# Patient Record
Sex: FEMALE | Race: BLACK OR AFRICAN AMERICAN | NOT HISPANIC OR LATINO | Employment: OTHER | URBAN - METROPOLITAN AREA
[De-identification: names, ages, dates, MRNs, and addresses within clinical notes are randomized per-mention and may not be internally consistent; named-entity substitution may affect disease eponyms.]

---

## 2017-01-11 PROBLEM — R07.89 ATYPICAL CHEST PAIN: Status: ACTIVE | Noted: 2017-01-11

## 2017-01-11 PROBLEM — Z99.2 ESRD ON DIALYSIS: Status: ACTIVE | Noted: 2017-01-11

## 2017-01-11 PROBLEM — N18.6 ESRD ON DIALYSIS: Status: ACTIVE | Noted: 2017-01-11

## 2017-01-16 ENCOUNTER — HOSPITAL ENCOUNTER (EMERGENCY)
Facility: HOSPITAL | Age: 82
Discharge: HOME | End: 2017-01-16
Attending: EMERGENCY MEDICINE
Payer: MEDICARE

## 2017-01-16 VITALS
BODY MASS INDEX: 20.91 KG/M2 | TEMPERATURE: 98 F | RESPIRATION RATE: 23 BRPM | HEIGHT: 63 IN | SYSTOLIC BLOOD PRESSURE: 161 MMHG | WEIGHT: 118 LBS | OXYGEN SATURATION: 97 % | DIASTOLIC BLOOD PRESSURE: 51 MMHG | HEART RATE: 85 BPM

## 2017-01-16 DIAGNOSIS — N18.6 ESRD (END STAGE RENAL DISEASE) ON DIALYSIS: Primary | ICD-10-CM

## 2017-01-16 DIAGNOSIS — Z99.2 ESRD (END STAGE RENAL DISEASE) ON DIALYSIS: Primary | ICD-10-CM

## 2017-01-16 LAB
ALBUMIN SERPL BCP-MCNC: 3.2 G/DL
ANION GAP SERPL CALC-SCNC: 22 MMOL/L
BUN SERPL-MCNC: 87 MG/DL
CALCIUM SERPL-MCNC: 9 MG/DL
CHLORIDE SERPL-SCNC: 95 MMOL/L
CO2 SERPL-SCNC: 16 MMOL/L
CREAT SERPL-MCNC: 7.9 MG/DL
EST. GFR  (AFRICAN AMERICAN): 5 ML/MIN/1.73 M^2
EST. GFR  (NON AFRICAN AMERICAN): 4 ML/MIN/1.73 M^2
GLUCOSE SERPL-MCNC: 280 MG/DL
PHOSPHATE SERPL-MCNC: 3.6 MG/DL
POTASSIUM SERPL-SCNC: 4.8 MMOL/L
SODIUM SERPL-SCNC: 133 MMOL/L

## 2017-01-16 PROCEDURE — 93010 ELECTROCARDIOGRAM REPORT: CPT | Mod: ,,, | Performed by: INTERNAL MEDICINE

## 2017-01-16 PROCEDURE — 99283 EMERGENCY DEPT VISIT LOW MDM: CPT

## 2017-01-16 PROCEDURE — 80069 RENAL FUNCTION PANEL: CPT

## 2017-01-16 PROCEDURE — 93005 ELECTROCARDIOGRAM TRACING: CPT

## 2017-01-16 NOTE — ED AVS SNAPSHOT
[facility] [address]  [place] [address]               Albina Garcia   2017  8:07 PM   ED    Description:  Female : 3/10/1932   Department:  [facility]           Your Care was Coordinated By:     Provider Role From To    Eliceo Velazquez MD Attending Provider 17 --      Reason for Visit     Needs Dialysis           Diagnoses this Visit        Comments    ESRD (end stage renal disease) on dialysis    -  Primary       ED Disposition     ED Disposition Condition Comment    Discharge             To Do List           Follow-up Information     Follow up with Judie Dsouza MD In 2 days.    Specialty:  Internal Medicine    Contact information:    [address] [facility] [place] [id number] [phone]          Follow up with [facility].    Why:  Attend scheduled appointment Wednesday.     Contact information:    [place], [place] [facility]     [facility] -  Assistance  Registered nurses in the [facility] provide clinical advisement, health education, appointment booking, and other advisory services.  Call for this free service at [phone].             Medications                Verify that the below list of medications is an accurate representation of the medications you are currently taking.  If none reported, the list may be blank. If incorrect, please contact your healthcare provider. Carry this list with you in case of emergency.           Current Medications     amlodipine (NORVASC) 10 MG tablet Take 1 tablet (10 mg total) by mouth once daily.    aspirin 81 MG Chew Take 1 tablet (81 mg total) by mouth once daily.    BD INSULIN SYRINGE ULTRA-FINE 0.5 mL 31 gauge x 5/16 Syrg USE TO INJECT INSULIN 2-3 TIMES A DAY    calcium acetate (PHOSLO) 667 mg capsule TAKE 2 TABLETS 3 TIMES A DAY WITH MEALS    carvedilol (COREG) 6.25 MG tablet Take 1 tablet (6.25 mg total) by mouth 2 (two) times daily.  "   cloNIDine (CATAPRES) 0.1 MG tablet Take 0.1 mg by mouth 2 (two) times daily.    cyproheptadine (PERIACTIN) 4 mg tablet TAKE 1 TABLET EVERY DAY    hydrALAZINE (APRESOLINE) 100 MG tablet Take 1 tablet (100 mg total) by mouth 3 (three) times daily.    insulin glargine (LANTUS) 100 unit/mL injection Inject 4 Units into the skin every evening.    irbesartan (AVAPRO) 300 MG tablet Take 300 mg by mouth once daily.    loperamide (IMODIUM A-D) 2 mg Tab Take 2 mg by mouth 4 (four) times daily as needed.    meclizine (ANTIVERT) 25 mg tablet Take 1 tablet (25 mg total) by mouth 3 (three) times daily as needed for Dizziness.    neomycin-polymyxin-dexamethasone (MAXITROL) 3.5mg/mL-10,000 unit/mL-0.1 % DrpS INSTILL 1 DROP IN BOTH EYE 4 TIMES DAILY    omeprazole (PRILOSEC) 40 MG capsule Take 40 mg by mouth once daily.    propylene glycol (SYSTANE BALANCE) 0.6 % Drop Place 1 drop into both eyes 2 (two) times daily.    SENSIPAR 30 mg Tab TAKE 1 TABLET EVERY DAY           Clinical Reference Information           Your Vitals Were     BP Pulse Temp Resp Height Weight    190/90 (BP Location: Left arm, Patient Position: Sitting) 86 98.1 °F (36.7 °C) (Oral) 20 5' 3" (1.6 m) 53.5 kg (118 lb)    Last Period SpO2 BMI          (LMP Unknown) 99% 20.9 kg/m2        Allergies as of 1/16/2017        Reactions    Penicillins       Immunizations Administered on Date of Encounter - 1/16/2017     None      ED Micro, Lab, POCT     Start Ordered       Status Ordering Provider    01/16/17 2035 01/16/17 2034  Renal function panel  STAT      Final result       ED Imaging Orders     None        Discharge Instructions       Attend your dialysis session on Wednesday as scheduled. If you have shortness of breath, chest pain or any other concerns please return to an Emergency Department.   Chronic Kidney Disease (CKD)    The role of the kidneys is to remove waste products and excess water from the blood.  When the kidneys do not function normally and waste " products begin to build up in the blood, this is called chronic kidney disease (CKD). CKD is defined as the presence of kidney damage or a decrease in kidney function lasting 3 months or more. CKD allows excess water, waste, and toxic substances to build up in the body. This can eventually become life-threatening, requiring dialysis or a kidney transplant to stay alive. This most severe form is called end stage renal disease or ESRD.  Diabetes is the leading causes of chronic renal failure. Other causes include high blood pressure, hardening of the arteries (atherosclerosis), lupus, inflammation of the blood vessels (vasculitis), prior viral and bacterial infections, and others. Certain over-the-counter pain medicines can cause renal failure when taken often over a long period of time. These include aspirin, ibuprofen, and related anti-inflammatory medicines called NSAIDs (nonsteroidal anti-inflammatory drugs).  Home care  The following guidelines will help you care for yourself at home:  1. If you have diabetes, talk to your doctor about the quality of your blood sugar control and any adjustments needed to your diet, lifestyle, or medicines.  2. If you have high blood pressure:  ¨ Take prescribed medicine to lower your blood pressure to the recommended goal of less than 130/80.  ¨ Take up a regular exercise program that you enjoy. Check with your doctor to be sure your planned exercise program is right for you.  ¨ Reduce your salt (sodium) intake. Your doctor can tell you how much salt per day is safe for you.  3. If you are overweight, talk to your doctor about a weight loss plan.  4. If you smoke, you must quit. Smoking worsens kidney disease. Talk to your doctor about ways to help you quit.  For more information, visit the following  links:  ¨ www.[place].gov/sites/default/files/pdf/clearing-the-air-accessible.pdf  ¨ www.smokefree.gov  ¨ www.cancer.org/healthy/stayawayfromtobacco/guidetoquittingsmoking/  5. Most patients with chronic renal failure need to follow a special diet.  Be sure you understand yours. In general, you will need to restrict protein, salt, potassium, and phosphorus. You also need to limit fluid intake. A calcium supplement may be prescribed to protect your bones from osteoporosis.  6. CKD is a risk factor for heart disease. Talk to your doctor about any other risk factors you might have and what you can do to lessen them.  7. Talk to your doctor about any medicines you are taking to determine if they need to be reduced or stopped.  8. Avoid the following over-the-counter medicines, or consult your doctor before using:  ¨ Aspirin and nonsteroidal anti-inflammatory drugs (NSAIDs) such as ibuprofen or naproxen (short-term use of acetaminophen for fever or pain is okay)  ¨ Laxatives and antacids containing magnesium or aluminum  ¨ Fleet or phospho soda enemas containing phosphorus  ¨ Certain stomach acid-blocking medicine such as cimetidine or ranitidine   ¨ Decongestants containing pseudoephedrine   ¨ Herbal supplements  Follow-up care  Follow up with your doctor or as advised by our staff. Contact one of the following for more information:  · American Association of Kidney Patients ([phone] 105-2676 www.aakp.org  · National Kidney Foundation (965) 346-1365 www.kidney.org  · American Kidney Fund (484) 282-8196 www.kidneyfund.org  · National Kidney Disease Education Program (396) 4NMADOQ www.nkdep.nih.gov  [NOTE: If an X-ray, EKG (cardiogram), or other diagnostic test was taken, another specialist will review it. You will be notified of any new findings that may affect your care.]  When to seek medical care  Get prompt medical attention or contact your doctor if any of the following occur:  · Nausea or vomiting  · Fever greater  than 100.4°F (38°C)  · Severe weakness, dizziness, fainting, drowsiness, or confusion  · Chest pain or shortness of breath  · Unexpected weight gain or swelling in the legs, ankles, or around the eyes  · Heart beating fast, slow, or irregularly  · Decrease or absent urine output  © 3778-2871 [employer]. [address] [place] [address]. All rights reserved. This information is not intended as a substitute for professional medical care. Always follow your healthcare professional's instructions.          Your Scheduled Appointments     Feb 07, 2017  2:00 PM CST   [facility] Hemod[facility] with VASCULAR, LAB   Jamaal Sullivan - Vascular Laboratory ([facility] )    [address]  [place] [address] [phone]            Feb 07, 2017  2:30 PM CST   Established Patient Visit with MD Jamaal Caba III - Vascular Surgery ([facility] )    [address]  [place] [address] [phone]              Smoking Cessation     If you would like to quit smoking:   You may be eligible for free services if you are a [place] resident and started smoking cigarettes before September 1, 1988.  Call the Smoking Cessation Trust ([facility]) toll free at (169) 987-4175 or (022) 268-4085.   Call 1-800-QUIT-NOW if you do not meet the above criteria.             [facility] complies with applicable Federal civil rights laws and does not discriminate on the basis of race, color, national origin, age, disability, or sex.        Language Assistance Services     ATTENTION: Language assistance services are available, free of charge. Please call [phone].      ATENCIÓN: Si habla español, tiene a jesus disposición servicios gratuitos de asistencia lingüística. Llame al [phone].     CHÚ Ý: N?u b?n nói Ti?ng Vi?t, có các d?ch v? h? tr? ngôn ng? mi?n phí dành cho b?n. G?i s? [phone].

## 2017-01-17 NOTE — ED PROVIDER NOTES
Encounter Date: 1/16/2017       History     Chief Complaint   Patient presents with    Needs Dialysis     Patient presents to the ED with family who states patient who is at nursing home missed her dialysis appointment today. Patient with no complaints.      Review of patient's allergies indicates:   Allergen Reactions    Penicillins      HPI Comments: CHIEF COMPLAINT: missed dialysis    HISTORY OF PRESENT ILLNESS:This is an 83 yo AAF who is  ESRD on dialysis who presents to the ED today because she missed her dialysis session today. There seems to have been some confusion over her session because she is being transferred to a new facility. Her orders actually were sent to another facility and therefore she was unable to  Receive her dialysis today. She has no shortness of breath, no chest pain, no complaints.     REVIEW OF SYSTEMS:  Constitutional: No fever, no chills.  Eyes: No discharge. No pain  HENT: No nasal drainage. No ear ache. No sore throat   Cardiovascular: No chest pain, no palpitations.  Respiratory: No cough, no shortness of breath.  Gastrointestinal: No abdominal pain, no vomiting. No diarrhea  Genitourinary: No hematuria, dysuria, urgency.  Musculoskeletal: No back pain.  Skin: No rashes, no lesions.  Neurological: No headache, no focal weakness.        The history is provided by the patient and a relative.     Past Medical History   Diagnosis Date    Anemia in CKD (chronic kidney disease) 10/14/2013    Arthritis     Back pain, chronic     Blood transfusion     CHF (congestive heart failure)     Diabetes mellitus     Elevated cholesterol     ESRD on hemodialysis      esrd    HTN (hypertension) 10/14/2013    Metabolic bone disease 10/14/2013     No past medical history pertinent negatives.  Past Surgical History   Procedure Laterality Date    Spinal fusion      Total abdominal hysterectomy      Total knee arthroplasty Left      left     Family History   Problem Relation Age of Onset     Kidney disease Mother     Diabetes Sister      Social History   Substance Use Topics    Smoking status: Former Smoker    Smokeless tobacco: Not on file    Alcohol use No     Review of Systems   All other systems reviewed and are negative.      Physical Exam   Initial Vitals   BP Pulse Resp Temp SpO2   01/16/17 1913 01/16/17 1913 01/16/17 1913 01/16/17 1913 01/16/17 1913   190/90 86 20 98.1 °F (36.7 °C) 99 %     Physical Exam    Nursing note and vitals reviewed.  Constitutional: She appears well-developed and well-nourished.   HENT:   Head: Normocephalic and atraumatic.   Nose: Nose normal.   Mouth/Throat: Oropharynx is clear and moist.   Eyes: Conjunctivae and EOM are normal. Pupils are equal, round, and reactive to light. No scleral icterus.   Neck: Normal range of motion. Neck supple. No JVD present.   Cardiovascular: Normal rate, regular rhythm, normal heart sounds and intact distal pulses. Exam reveals no gallop and no friction rub.    No murmur heard.  Pulmonary/Chest: Breath sounds normal. No stridor. No respiratory distress. She has no wheezes. She exhibits no tenderness.   Abdominal: Soft. Bowel sounds are normal. She exhibits no distension and no mass. There is no tenderness. There is no rebound and no guarding.   Musculoskeletal: Normal range of motion. She exhibits no edema or tenderness.   Neurological: She is alert and oriented to person, place, and time. She has normal strength. No cranial nerve deficit.   Skin: Skin is warm and dry. No rash noted. No pallor.   Psychiatric: She has a normal mood and affect. Thought content normal.         ED Course   Procedures  Labs Reviewed   RENAL FUNCTION PANEL - Abnormal; Notable for the following:        Result Value    Glucose 280 (*)     Sodium 133 (*)     CO2 16 (*)     BUN, Bld 87 (*)     Creatinine 7.9 (*)     Albumin 3.2 (*)     eGFR if  5 (*)     eGFR if non African American 4 (*)     Anion Gap 22 (*)     All other components within  normal limits     EKG Readings: (Independently Interpreted)   Vent. Rate : 086 BPM     Atrial Rate : 086 BPM     P-R Int : 154 ms          QRS Dur : 072 ms      QT Int : 388 ms       P-R-T Axes : 058 019 065 degrees     QTc Int : 464 ms    Normal sinus rhythm  Normal ECG  When compared with ECG of 18-MAY-2015 09:11,  No significant change was found            Medical Decision Making:   Differential Diagnosis:   ESRD on dialysi  Clinical Tests:   Lab Tests: Ordered and Reviewed  Medical Tests: Ordered and Reviewed  ED Management:  This is an 84 o female who missed her dialysis session due to confusion over her new facility. I have spoke with the Nursing Home and it appears that she will be set for dialysis at her next appointment. At this time the pt has no need for emergent dialysis. No need for admission today. She will be discharged back to the Nursing home with directions for return should she experience any shortness of breath, chest pain, if she is unable to get her dialysis session at her nest session or any other concerning symptoms. After taking into careful account the historical factors and physical exam findings of the patient's presentation today, in conjunction with the empirical and objective data obtained on ED workup, no acute emergent medical condition has been identified. The patient appears to be low risk for an emergent medical condition and I feel it is safe and appropriate at this time for the patient to be discharged to follow-up as detailed in their discharge instructions for reevaluation and possible continued outpatient workup and management. Discharge and follow-up instructions discussed with the patient who expressed understanding and willingness to comply with my recommendations.                   ED Course     Clinical Impression:   The encounter diagnosis was ESRD (end stage renal disease) on dialysis.    Disposition:   Disposition: Discharged  Condition: Stable       Sundeepluisana RIZVI  MD Marcus  02/04/17 0907

## 2017-01-17 NOTE — ED TRIAGE NOTES
The patient's daughter states that she brought the patient to the ER because the patient was slipped over for dialysis today. The patient denies any complaints of pain, shortness of breath, weakness, or swelling.

## 2017-01-17 NOTE — DISCHARGE INSTRUCTIONS
Attend your dialysis session on Wednesday as scheduled. If you have shortness of breath, chest pain or any other concerns please return to an Emergency Department.   Chronic Kidney Disease (CKD)    The role of the kidneys is to remove waste products and excess water from the blood.  When the kidneys do not function normally and waste products begin to build up in the blood, this is called chronic kidney disease (CKD). CKD is defined as the presence of kidney damage or a decrease in kidney function lasting 3 months or more. CKD allows excess water, waste, and toxic substances to build up in the body. This can eventually become life-threatening, requiring dialysis or a kidney transplant to stay alive. This most severe form is called end stage renal disease or ESRD.  Diabetes is the leading causes of chronic renal failure. Other causes include high blood pressure, hardening of the arteries (atherosclerosis), lupus, inflammation of the blood vessels (vasculitis), prior viral and bacterial infections, and others. Certain over-the-counter pain medicines can cause renal failure when taken often over a long period of time. These include aspirin, ibuprofen, and related anti-inflammatory medicines called NSAIDs (nonsteroidal anti-inflammatory drugs).  Home care  The following guidelines will help you care for yourself at home:  1. If you have diabetes, talk to your doctor about the quality of your blood sugar control and any adjustments needed to your diet, lifestyle, or medicines.  2. If you have high blood pressure:  ¨ Take prescribed medicine to lower your blood pressure to the recommended goal of less than 130/80.  ¨ Take up a regular exercise program that you enjoy. Check with your doctor to be sure your planned exercise program is right for you.  ¨ Reduce your salt (sodium) intake. Your doctor can tell you how much salt per day is safe for you.  3. If you are overweight, talk to your doctor about a weight loss  plan.  4. If you smoke, you must quit. Smoking worsens kidney disease. Talk to your doctor about ways to help you quit.  For more information, visit the following links:  ¨ www.smokefree.gov/sites/default/files/pdf/clearing-the-air-accessible.pdf  ¨ www.smokefree.gov  ¨ www.cancer.org/healthy/stayawayfromtobacco/guidetoquittingsmoking/  5. Most patients with chronic renal failure need to follow a special diet.  Be sure you understand yours. In general, you will need to restrict protein, salt, potassium, and phosphorus. You also need to limit fluid intake. A calcium supplement may be prescribed to protect your bones from osteoporosis.  6. CKD is a risk factor for heart disease. Talk to your doctor about any other risk factors you might have and what you can do to lessen them.  7. Talk to your doctor about any medicines you are taking to determine if they need to be reduced or stopped.  8. Avoid the following over-the-counter medicines, or consult your doctor before using:  ¨ Aspirin and nonsteroidal anti-inflammatory drugs (NSAIDs) such as ibuprofen or naproxen (short-term use of acetaminophen for fever or pain is okay)  ¨ Laxatives and antacids containing magnesium or aluminum  ¨ Fleet or phospho soda enemas containing phosphorus  ¨ Certain stomach acid-blocking medicine such as cimetidine or ranitidine   ¨ Decongestants containing pseudoephedrine   ¨ Herbal supplements  Follow-up care  Follow up with your doctor or as advised by our staff. Contact one of the following for more information:  · American Association of Kidney Patients (128) 255-3218 www.aakp.org  · National Kidney Foundation (064) 305-8488 www.kidney.org  · American Kidney Fund (328) 279-4146 www.kidneyfund.org  · National Kidney Disease Education Program (218) 3ZWTOJJ www.nkdep.nih.gov  [NOTE: If an X-ray, EKG (cardiogram), or other diagnostic test was taken, another specialist will review it. You will be notified of any new findings that may affect  your care.]  When to seek medical care  Get prompt medical attention or contact your doctor if any of the following occur:  · Nausea or vomiting  · Fever greater than 100.4°F (38°C)  · Severe weakness, dizziness, fainting, drowsiness, or confusion  · Chest pain or shortness of breath  · Unexpected weight gain or swelling in the legs, ankles, or around the eyes  · Heart beating fast, slow, or irregularly  · Decrease or absent urine output  © 8973-4615 [employer]. [address] [place] [address]. All rights reserved. This information is not intended as a substitute for professional medical care. Always follow your healthcare professional's instructions.

## 2017-01-18 DIAGNOSIS — I50.9 CHF (CONGESTIVE HEART FAILURE): Primary | ICD-10-CM

## 2017-01-31 ENCOUNTER — TELEPHONE (OUTPATIENT)
Dept: VASCULAR SURGERY | Facility: CLINIC | Age: 82
End: 2017-01-31

## 2017-01-31 NOTE — TELEPHONE ENCOUNTER
Appt was r/s and mailed out to the pt daughter Sina . I spoke to her and she stated She wanted to make sure that the pt needed to come in for a f/u . I told her yes.

## 2017-01-31 NOTE — TELEPHONE ENCOUNTER
----- Message from Luis Lee sent at 1/30/2017  4:11 PM CST -----  Contact: Sina//Daughter  Caller states that she needs to speak with nurse in ref to if the pt will still need to have her shunt replaced;Pts appts were cancelled earlier today by her  and caller states that they should not have been cancelled;Caller states that the pt is in a nursing facility and would bring the pt if it is time for her to be seen//please call back at [phone] or [phone]//thank you

## 2017-02-03 RX ORDER — CALCIUM ACETATE 667 MG/1
CAPSULE ORAL
Qty: 180 CAPSULE | Refills: 5 | Status: SHIPPED | OUTPATIENT
Start: 2017-02-03

## 2017-02-06 DIAGNOSIS — N18.6 ESRD ON DIALYSIS: Primary | ICD-10-CM

## 2017-02-06 DIAGNOSIS — Z99.2 ESRD ON DIALYSIS: Primary | ICD-10-CM

## 2017-02-06 RX ORDER — CINACALCET 30 MG/1
30 TABLET, FILM COATED ORAL DAILY
Qty: 30 TABLET | Refills: 6 | OUTPATIENT
Start: 2017-02-06

## 2017-02-07 DIAGNOSIS — N18.6 ESRD ON DIALYSIS: Primary | ICD-10-CM

## 2017-02-07 DIAGNOSIS — Z99.2 ESRD ON DIALYSIS: Primary | ICD-10-CM

## 2017-02-07 RX ORDER — CINACALCET 30 MG/1
30 TABLET, FILM COATED ORAL DAILY
Qty: 30 TABLET | Refills: 6 | OUTPATIENT
Start: 2017-02-07

## 2017-02-21 ENCOUNTER — OFFICE VISIT (OUTPATIENT)
Dept: VASCULAR SURGERY | Facility: CLINIC | Age: 82
End: 2017-02-21
Payer: MEDICARE

## 2017-02-21 ENCOUNTER — HOSPITAL ENCOUNTER (OUTPATIENT)
Dept: VASCULAR SURGERY | Facility: CLINIC | Age: 82
Discharge: HOME | End: 2017-02-21
Attending: SURGERY
Payer: MEDICARE

## 2017-02-21 VITALS
HEIGHT: 60 IN | HEART RATE: 77 BPM | WEIGHT: 111 LBS | BODY MASS INDEX: 21.79 KG/M2 | SYSTOLIC BLOOD PRESSURE: 185 MMHG | DIASTOLIC BLOOD PRESSURE: 80 MMHG | TEMPERATURE: 96 F

## 2017-02-21 DIAGNOSIS — T82.858D STENOSIS OF ARTERIOVENOUS DIALYSIS FISTULA, SUBSEQUENT ENCOUNTER: Primary | ICD-10-CM

## 2017-02-21 DIAGNOSIS — N18.6 ESRD (END STAGE RENAL DISEASE) ON DIALYSIS: ICD-10-CM

## 2017-02-21 DIAGNOSIS — Z99.2 ESRD (END STAGE RENAL DISEASE) ON DIALYSIS: ICD-10-CM

## 2017-02-21 PROCEDURE — 93990 DOPPLER FLOW TESTING: CPT | Mod: 26,S$PBB,, | Performed by: SURGERY

## 2017-02-21 PROCEDURE — 99999 PR PBB SHADOW E&M-EST. PATIENT-LVL III: CPT | Mod: PBBFAC,,, | Performed by: SURGERY

## 2017-02-21 PROCEDURE — 99213 OFFICE O/P EST LOW 20 MIN: CPT | Mod: PBBFAC | Performed by: SURGERY

## 2017-02-21 PROCEDURE — 99213 OFFICE O/P EST LOW 20 MIN: CPT | Mod: S$PBB,,, | Performed by: SURGERY

## 2017-02-21 NOTE — PROGRESS NOTES
See my prior note; review of systems, family history and social history are   unchanged.    HISTORY OF PRESENT ILLNESS:  An 84-year-old female with end-stage renal disease   status post:  1.  Angioplasty, left brachiocephalic vein stenosis, stent placement, and   subclavian vein angioplasty, 10/26/2016.  2.  Prior left basilic vein transposition 4 years ago at an outside institution.    She now returns.  She denies any prolonged bleeding or other dialysis problems.    PHYSICAL EXAMINATION:  VITAL SIGNS:  See nursing note.  EXTREMITIES:  Left arm shows significant pulsatility in the first 1-2 cm of the   fistula and after that much improved thrill with only modest underlying   pulsatility.    IMAGING:  Duplex exam does show proximal AV fistula with velocity of 764.  There   is no mid graft velocity shift seen.  The subclavian vein does have somewhat   elevated velocities of 254.  Her prior fistulogram was personally reviewed.  She   did have a stenosis at the thoracic inlet and was angioplastied but not   stented.    Of note, the flow volume of today is 1.3 liters, which is down from 2.1 liters   prior. Before that however, it was 1.1 liters.    ASSESSMENT:  Proximal AV fistula stenosis, but still with robust flow volume and   clinically the flow is quite strong in the body of the fistula.    She is not having any clinical issues.  However, her flow volume appears to have   dropped fairly considerably.    She is reluctant to undergo a prophylactic intervention of this proximal   stenosis unless absolutely necessary.  It is reasonable to watch this closely   given that she is not having any clinical sequelae.    PLAN:  Follow up in eight weeks with repeat surveillance duplex.        /boone [id number] yue(s)        KATHY  dd: 02/21/2017 14:21:51 (CST)  td: 02/22/2017 05:31:14 (CST)  Doc ID   #[id number]  Job ID #[id number]    CC:

## 2017-02-21 NOTE — MR AVS SNAPSHOT
[facility]  [address]  [place] [address]  Phone: [phone]  Fax: [phone]                  Albina Garcia   2017 1:30 PM   Office Visit    Description:  Female : 3/10/1932   Provider:  ERIK Andrade III, MD   Department:  [facility]           Reason for Visit     Follow-up           Diagnoses this Visit        Comments    Stenosis of arteriovenous dialysis fistula, subsequent encounter    -  Primary            To Do List           Goals (5 Years of Data)     None      Follow-Up and Disposition     Return in about 8 weeks (around 2017).      [facility]     [facility] -  Assistance  Registered nurses in the [facility] provide clinical advisement, health education, appointment booking, and other advisory services.  Call for this free service at [phone].             Medications                Verify that the below list of medications is an accurate representation of the medications you are currently taking.  If none reported, the list may be blank. If incorrect, please contact your healthcare provider. Carry this list with you in case of emergency.           Current Medications     amlodipine (NORVASC) 10 MG tablet Take 1 tablet (10 mg total) by mouth once daily.    aspirin 81 MG Chew Take 1 tablet (81 mg total) by mouth once daily.    BD INSULIN SYRINGE ULTRA-FINE 0.5 mL 31 gauge x 5/16 Syrg USE TO INJECT INSULIN 2-3 TIMES A DAY    calcium acetate (PHOSLO) 667 mg capsule TAKE 2 CAPSULES BY MOUTH 3 TIMES A DAY WITH MEALS    carvedilol (COREG) 6.25 MG tablet Take 1 tablet (6.25 mg total) by mouth 2 (two) times daily.    cloNIDine (CATAPRES) 0.1 MG tablet Take 0.1 mg by mouth 2 (two) times daily.    cyproheptadine (PERIACTIN) 4 mg tablet TAKE 1 TABLET EVERY DAY    hydrALAZINE (APRESOLINE) 100 MG tablet Take 1 tablet (100 mg total) by mouth 3 (three) times daily.    insulin glargine (LANTUS) 100  unit/mL injection Inject 4 Units into the skin every evening.    irbesartan (AVAPRO) 300 MG tablet Take 300 mg by mouth once daily.    loperamide (IMODIUM A-D) 2 mg Tab Take 2 mg by mouth 4 (four) times daily as needed.    meclizine (ANTIVERT) 25 mg tablet Take 1 tablet (25 mg total) by mouth 3 (three) times daily as needed for Dizziness.    neomycin-polymyxin-dexamethasone (MAXITROL) 3.5mg/mL-10,000 unit/mL-0.1 % DrpS INSTILL 1 DROP IN BOTH EYE 4 TIMES DAILY    omeprazole (PRILOSEC) 40 MG capsule Take 40 mg by mouth once daily.    propylene glycol (SYSTANE BALANCE) 0.6 % Drop Place 1 drop into both eyes 2 (two) times daily.    SENSIPAR 30 mg Tab TAKE 1 TABLET EVERY DAY           Clinical Reference Information           Your Vitals Were     BP Pulse Temp Height Weight Last Period    185/80 (BP Location: Right arm, Patient Position: Sitting, BP Method: Automatic) 77 96 °F (35.6 °C) (Oral) 5' (1.524 m) 50.3 kg (111 lb) (LMP Unknown)    BMI                21.68 kg/m2          Blood Pressure          Most Recent Value    BP  (!)  185/80      Allergies as of 2/21/2017     Penicillins      Immunizations Administered on Date of Encounter - 2/21/2017     None      Language Assistance Services     ATTENTION: Language assistance services are available, free of charge. Please call [phone].      ATENCIÓN: Si habla español, tiene a jesus disposición servicios gratuitos de asistencia lingüística. Llame al [phone].     CHÚ Ý: N?u b?n nói Ti?ng Vi?t, có các d?ch v? h? tr? ngôn ng? mi?n phí dành cho b?n. G?i s? [phone].         [facility] complies with applicable Federal civil rights laws and does not discriminate on the basis of race, color, national origin, age, disability, or sex.

## 2017-02-23 DIAGNOSIS — N18.6 ESRD (END STAGE RENAL DISEASE) ON DIALYSIS: Primary | ICD-10-CM

## 2017-02-23 DIAGNOSIS — Z99.2 ESRD (END STAGE RENAL DISEASE) ON DIALYSIS: Primary | ICD-10-CM

## 2017-04-18 ENCOUNTER — OFFICE VISIT (OUTPATIENT)
Dept: VASCULAR SURGERY | Facility: CLINIC | Age: 82
End: 2017-04-18
Payer: MEDICARE

## 2017-04-18 ENCOUNTER — HOSPITAL ENCOUNTER (OUTPATIENT)
Dept: VASCULAR SURGERY | Facility: CLINIC | Age: 82
Discharge: HOME | End: 2017-04-18
Attending: SURGERY
Payer: MEDICARE

## 2017-04-18 VITALS
DIASTOLIC BLOOD PRESSURE: 53 MMHG | WEIGHT: 105 LBS | SYSTOLIC BLOOD PRESSURE: 134 MMHG | HEIGHT: 59 IN | HEART RATE: 74 BPM | TEMPERATURE: 97 F | BODY MASS INDEX: 21.17 KG/M2

## 2017-04-18 DIAGNOSIS — N18.6 ESRD (END STAGE RENAL DISEASE) ON DIALYSIS: ICD-10-CM

## 2017-04-18 DIAGNOSIS — Z99.2 ESRD (END STAGE RENAL DISEASE) ON DIALYSIS: ICD-10-CM

## 2017-04-18 DIAGNOSIS — T82.858D STENOSIS OF ARTERIOVENOUS DIALYSIS FISTULA, SUBSEQUENT ENCOUNTER: Primary | ICD-10-CM

## 2017-04-18 PROCEDURE — 99213 OFFICE O/P EST LOW 20 MIN: CPT | Mod: S$PBB,,, | Performed by: SURGERY

## 2017-04-18 PROCEDURE — 93990 DOPPLER FLOW TESTING: CPT | Mod: 26,S$PBB,, | Performed by: SURGERY

## 2017-04-18 PROCEDURE — 99999 PR PBB SHADOW E&M-EST. PATIENT-LVL III: CPT | Mod: PBBFAC,,, | Performed by: SURGERY

## 2017-04-18 PROCEDURE — 99213 OFFICE O/P EST LOW 20 MIN: CPT | Mod: PBBFAC | Performed by: SURGERY

## 2017-04-18 NOTE — PROGRESS NOTES
See my prior note; review of systems, family history and social history are   unchanged.    HISTORY OF PRESENT ILLNESS:  An 85-year-old female with end-stage renal disease,   status post:  1.  Angioplasty, left brachiocephalic stenosis and stent placement and   subclavian vein angioplasty, 10/26/2016.  2.  Prior left basilic vein transposition four years ago at an outside   institution.    She now returns.  This is two-month followup.  She denies any prolonged bleeding   or other dialysis problems.  She was noted to have overall decreased flow rate   and suggestion of a fairly high-grade proximal AV fistula stenosis prior with   velocity of 764.    PHYSICAL EXAMINATION:  VITAL SIGNS:  See nursing note.  EXTREMITIES:  Left arm shows moderate pulsatility in the first 1 to 2 cm of the   fistula though not as impressive as prior.  There is an excellent thrill distal   to this.    IMAGING:  Duplex of the fistula fails to show proximal hemodynamically   significant stenosis, which has been seen prior.  Flow volume is stable at 1.2   liters compared to 1.3 prior.  Of note, the flow velocities have been as high as   2.1 liters.    ASSESSMENT:  Well-functioning fistula with more proximal high-grade stenosis,   not appear to be as impressive today.    PLAN:  1.  Continue observation and lengthen surveillance.  2.  Follow up in four months with surveillance duplex, sooner if clinically   indicated.      KATHY  dd: 04/18/2017 14:21:05 (CDT)  td: 04/19/2017 08:59:16 (CDT)  Doc ID   #[id number]  Job ID #[id number]    CC:

## 2017-04-20 DIAGNOSIS — N18.6 ESRD (END STAGE RENAL DISEASE) ON DIALYSIS: Primary | ICD-10-CM

## 2017-04-20 DIAGNOSIS — Z99.2 ESRD (END STAGE RENAL DISEASE) ON DIALYSIS: Primary | ICD-10-CM

## 2017-05-17 PROBLEM — R07.9 CHEST PAIN: Status: ACTIVE | Noted: 2017-05-17

## 2017-05-18 PROBLEM — I25.10 CORONARY ARTERY DISEASE INVOLVING NATIVE CORONARY ARTERY: Status: ACTIVE | Noted: 2017-05-18

## 2017-05-18 PROBLEM — I20.0 UNSTABLE ANGINA: Status: ACTIVE | Noted: 2017-05-18

## 2017-05-19 PROBLEM — R07.9 CHEST PAIN: Status: RESOLVED | Noted: 2017-05-17 | Resolved: 2017-05-19

## 2017-08-15 ENCOUNTER — OFFICE VISIT (OUTPATIENT)
Dept: VASCULAR SURGERY | Facility: CLINIC | Age: 82
End: 2017-08-15
Payer: MEDICARE

## 2017-08-15 ENCOUNTER — HOSPITAL ENCOUNTER (OUTPATIENT)
Dept: VASCULAR SURGERY | Facility: CLINIC | Age: 82
Discharge: HOME | End: 2017-08-15
Attending: SURGERY
Payer: MEDICARE

## 2017-08-15 VITALS
TEMPERATURE: 98 F | BODY MASS INDEX: 24.35 KG/M2 | DIASTOLIC BLOOD PRESSURE: 74 MMHG | WEIGHT: 124 LBS | HEIGHT: 60 IN | SYSTOLIC BLOOD PRESSURE: 169 MMHG | HEART RATE: 78 BPM

## 2017-08-15 DIAGNOSIS — N18.6 ESRD (END STAGE RENAL DISEASE) ON DIALYSIS: Primary | ICD-10-CM

## 2017-08-15 DIAGNOSIS — Z99.2 ESRD (END STAGE RENAL DISEASE) ON DIALYSIS: ICD-10-CM

## 2017-08-15 DIAGNOSIS — Z99.2 ESRD (END STAGE RENAL DISEASE) ON DIALYSIS: Primary | ICD-10-CM

## 2017-08-15 DIAGNOSIS — N18.6 ESRD (END STAGE RENAL DISEASE) ON DIALYSIS: ICD-10-CM

## 2017-08-15 PROCEDURE — 1157F ADVNC CARE PLAN IN RCRD: CPT | Mod: ,,, | Performed by: SURGERY

## 2017-08-15 PROCEDURE — 99213 OFFICE O/P EST LOW 20 MIN: CPT | Mod: PBBFAC | Performed by: SURGERY

## 2017-08-15 PROCEDURE — 93990 DOPPLER FLOW TESTING: CPT | Mod: 26,S$PBB,, | Performed by: SURGERY

## 2017-08-15 PROCEDURE — 1126F AMNT PAIN NOTED NONE PRSNT: CPT | Mod: ,,, | Performed by: SURGERY

## 2017-08-15 PROCEDURE — 3008F BODY MASS INDEX DOCD: CPT | Mod: ,,, | Performed by: SURGERY

## 2017-08-15 PROCEDURE — 93990 DOPPLER FLOW TESTING: CPT | Mod: PBBFAC | Performed by: SURGERY

## 2017-08-15 PROCEDURE — 99999 PR PBB SHADOW E&M-EST. PATIENT-LVL III: CPT | Mod: PBBFAC,,, | Performed by: SURGERY

## 2017-08-15 PROCEDURE — 1159F MED LIST DOCD IN RCRD: CPT | Mod: ,,, | Performed by: SURGERY

## 2017-08-15 PROCEDURE — 99213 OFFICE O/P EST LOW 20 MIN: CPT | Mod: S$PBB,,, | Performed by: SURGERY

## 2017-08-15 NOTE — PROGRESS NOTES
See my prior note; review of systems, family history and social history are   unchanged.    HISTORY OF PRESENT ILLNESS:  An 85-year-old female with end-stage renal disease,   status post:  1.  Angioplasty, left brachiocephalic stenosis and stent placement and   subclavian vein angioplasty, 10/26/2016.  2.  Prior left basilic vein transposition four years ago at an outside   institution.    She now returns.  This is 4-month followup.  Having only intermittient increased bleeding, no other issues    PHYSICAL EXAMINATION:  VITAL SIGNS:  See nursing note.  EXTREMITIES:  Left arm shows mild pulsatility in the first 1 to 2 cm of the   fistula though not as impressive as prior.  There is an excellent thrill distal   to this.    IMAGING:  Duplex of the fistula fails to show moderate outflow stneosis ().  Flow volume 1.7 (1.2 prior)    ASSESSMENT:  Well-functioning fistula with more proximal high-grade stenosis,   not appear to be as impressive today.    Moderate outflow stenosis only.  Will observe      PLAN:  1.  Continue observation  2.  Follow up in 3 months with surveillance duplex, sooner if clinically   indicated.

## 2017-08-17 DIAGNOSIS — Z99.2 ESRD (END STAGE RENAL DISEASE) ON DIALYSIS: Primary | ICD-10-CM

## 2017-08-17 DIAGNOSIS — N18.6 ESRD (END STAGE RENAL DISEASE) ON DIALYSIS: Primary | ICD-10-CM

## 2017-09-02 PROBLEM — A41.9 SEPSIS: Status: ACTIVE | Noted: 2017-09-02

## 2017-09-03 PROBLEM — I20.0 UNSTABLE ANGINA: Status: RESOLVED | Noted: 2017-05-18 | Resolved: 2017-09-03

## 2017-09-04 PROBLEM — J06.9 UPPER RESPIRATORY INFECTION: Status: ACTIVE | Noted: 2017-09-04

## 2017-09-04 PROBLEM — A41.9 SEPSIS: Status: RESOLVED | Noted: 2017-09-02 | Resolved: 2017-09-04

## 2017-09-04 PROBLEM — N39.0 URINARY TRACT INFECTION WITHOUT HEMATURIA: Status: ACTIVE | Noted: 2017-09-04

## 2017-09-05 ENCOUNTER — OUTPATIENT CASE MANAGEMENT (OUTPATIENT)
Dept: ADMINISTRATIVE | Facility: OTHER | Age: 82
End: 2017-09-05

## 2017-09-05 NOTE — PROGRESS NOTES
Please note that this patient was not enrolled in Outpatient Case Management at this time due to being transferred to a facility.     Please contact [facility] at Ext. [phone] with questions.    Thank you,  Sneha Tinajero

## 2017-09-14 PROBLEM — I21.4 NSTEMI (NON-ST ELEVATED MYOCARDIAL INFARCTION): Status: ACTIVE | Noted: 2017-09-14

## 2017-09-14 PROBLEM — R79.89 ELEVATED TROPONIN: Status: ACTIVE | Noted: 2017-09-14

## 2017-09-14 PROBLEM — R07.9 CHEST PAIN: Status: ACTIVE | Noted: 2017-09-14

## 2017-09-14 PROBLEM — K21.9 GERD (GASTROESOPHAGEAL REFLUX DISEASE): Status: ACTIVE | Noted: 2017-09-14

## 2017-09-20 ENCOUNTER — PATIENT MESSAGE (OUTPATIENT)
Dept: ADMINISTRATIVE | Facility: OTHER | Age: 82
End: 2017-09-20

## 2017-09-29 ENCOUNTER — HOSPITAL ENCOUNTER (OUTPATIENT)
Facility: HOSPITAL | Age: 82
Discharge: HOME | End: 2017-09-29
Attending: INTERNAL MEDICINE | Admitting: INTERNAL MEDICINE
Payer: MEDICARE

## 2017-09-29 VITALS
HEART RATE: 70 BPM | DIASTOLIC BLOOD PRESSURE: 76 MMHG | BODY MASS INDEX: 22.86 KG/M2 | RESPIRATION RATE: 17 BRPM | OXYGEN SATURATION: 96 % | TEMPERATURE: 98 F | WEIGHT: 129 LBS | SYSTOLIC BLOOD PRESSURE: 180 MMHG | HEIGHT: 63 IN

## 2017-09-29 DIAGNOSIS — T82.858D STENOSIS OF ARTERIOVENOUS DIALYSIS FISTULA, SUBSEQUENT ENCOUNTER: ICD-10-CM

## 2017-09-29 DIAGNOSIS — Z99.2 ESRD (END STAGE RENAL DISEASE) ON DIALYSIS: Primary | ICD-10-CM

## 2017-09-29 DIAGNOSIS — N18.6 ESRD (END STAGE RENAL DISEASE) ON DIALYSIS: Primary | ICD-10-CM

## 2017-09-29 LAB — POCT GLUCOSE: 129 MG/DL (ref 70–110)

## 2017-09-29 PROCEDURE — 36902 INTRO CATH DIALYSIS CIRCUIT: CPT | Mod: ,,, | Performed by: INTERNAL MEDICINE

## 2017-09-29 PROCEDURE — 82962 GLUCOSE BLOOD TEST: CPT

## 2017-09-29 PROCEDURE — 63600175 PHARM REV CODE 636 W HCPCS

## 2017-09-29 PROCEDURE — 25000003 PHARM REV CODE 250

## 2017-09-29 PROCEDURE — 25000003 PHARM REV CODE 250: Performed by: INTERNAL MEDICINE

## 2017-09-29 RX ORDER — CLONIDINE HYDROCHLORIDE 0.1 MG/1
0.1 TABLET ORAL ONCE
Status: COMPLETED | OUTPATIENT
Start: 2017-09-29 | End: 2017-09-29

## 2017-09-29 RX ADMIN — CLONIDINE HYDROCHLORIDE 0.1 MG: 0.1 TABLET ORAL at 11:09

## 2017-09-29 NOTE — BRIEF OP NOTE
[facility]  Brief Operative Note     SUMMARY     Surgery Date: 9/29/2017     Surgeon(s) and Role:     * Alpesh Malik MD - Primary    Assisting Surgeon: None    Pre-op Diagnosis:  Malfunction of arteriovenous dialysis fistula, subsequent encounter [T83.968D]    Post-op Diagnosis:  ISR PTA 8 mm x 6 cm ballon and   8 mmx 6 cm Lutonix balloon.    Procedure(s) (LRB):  FISTULOGRAM (Right)    Estimated Blood Loss:  5 ml.         Specimens:   Specimen (12h ago through future)    None

## 2017-09-29 NOTE — PROGRESS NOTES
09/29/17 1101 09/29/17 1115   Vital Signs   BP (!) 206/84 (!) 198/56   BP Location Right arm Right arm   BP Method Automatic Manual   Patient Position Lying Lying     Dr Malik notified of above results. Clonidine ordered and given. Will monitor.

## 2017-09-29 NOTE — DISCHARGE SUMMARY
[facility]  Discharge Note  Short Stay    Admit Date: 9/29/2017    Discharge Date and Time: No discharge date for patient encounter.     Attending Physician: Alpesh Malik MD     Discharge Provider: Alpesh Malik    Diagnoses:  ISR PTA 8 mm x 6 cm ballon and   8 mmx 6 cm Lutonix balloon.  Active Hospital Problems    Diagnosis  POA    Stenosis of arteriovenous dialysis fistula [T82.858A]  Yes      Resolved Hospital Problems    Diagnosis Date Resolved POA   No resolved problems to display.       Discharged Condition: good    Hospital Course: Patient was admitted for an outpatient procedure and tolerated the procedure well with no complications.    Final Diagnoses: Same as principal problem.    Disposition: Home or Self Care    Follow up/Patient Instructions:    Medications:  Reconciled Home Medications:   Current Discharge Medication List      CONTINUE these medications which have NOT CHANGED    Details   acetaminophen (TYLENOL) 325 MG tablet Take 2 tablets (650 mg total) by mouth every 8 (eight) hours as needed for Pain or Temperature greater than (100.4 F).  Refills: 0      aspirin 81 MG Chew Take 1 tablet (81 mg total) by mouth once daily.  Refills: 0      atorvastatin (LIPITOR) 40 MG tablet Take 1 tablet (40 mg total) by mouth once daily.  Qty: 30 tablet, Refills: 11      calcium acetate (PHOSLO) 667 mg capsule TAKE 2 CAPSULES BY MOUTH 3 TIMES A DAY WITH MEALS  Qty: 180 capsule, Refills: 5      carvedilol (COREG) 6.25 MG tablet Take 1 tablet (6.25 mg total) by mouth 2 (two) times daily.  Qty: 60 tablet, Refills: 11      clopidogrel (PLAVIX) 75 mg tablet Take 1 tablet (75 mg total) by mouth once daily.  Qty: 30 tablet, Refills: 11      cyproheptadine (PERIACTIN) 4 mg tablet TAKE 1 TABLET EVERY DAY  Qty: 30 tablet, Refills: 6      hydrALAZINE (APRESOLINE) 100 MG tablet Take 1 tablet (100 mg total) by mouth 3 (three) times daily.  Qty: 270 tablet, Refills: 6      insulin aspart (NOVOLOG) 100  unit/mL InPn pen Inject 0-5 Units into the skin before meals and at bedtime as needed (Hyperglycemia).  Refills: 0      irbesartan (AVAPRO) 300 MG tablet Take 300 mg by mouth once daily.  Refills: 3      levoFLOXacin (LEVAQUIN) 250 MG tablet Take 1 tablet (250 mg total) by mouth every other day. FOR 10 MORE DAYS      meclizine (ANTIVERT) 25 mg tablet Take 1 tablet (25 mg total) by mouth 3 (three) times daily as needed for Dizziness.  Qty: 30 tablet, Refills: 0      polyvinyl alcohol, artificial tears, (LIQUIFILM TEARS) 1.4 % ophthalmic solution Place 1 drop into both eyes as needed.      propylene glycol (SYSTANE BALANCE) 0.6 % Drop Place 1 drop into both eyes 2 (two) times daily.      SENSIPAR 30 mg Tab TAKE 1 TABLET EVERY DAY  Qty: 30 tablet, Refills: 6      vitamin renal formula, B-complex-vitamin c-folic acid, (NEPHROCAP) 1 mg Cap Take 1 capsule by mouth once daily.  Qty: 30 capsule, Refills: 3      amlodipine (NORVASC) 10 MG tablet Take 1 tablet (10 mg total) by mouth once daily.  Qty: 30 tablet, Refills: 3      BD INSULIN SYRINGE ULTRA-FINE 0.5 mL 31 gauge x 5/16 Syrg USE TO INJECT INSULIN 2-3 TIMES A DAY  Refills: 3      loperamide (IMODIUM A-D) 2 mg Tab Take 2 mg by mouth 4 (four) times daily as needed.      nitroGLYCERIN (NITROSTAT) 0.4 MG SL tablet Place 1 tablet (0.4 mg total) under the tongue every 5 (five) minutes as needed for Chest pain (maximum of 3 tablets in 15 minutes.).  Qty: 30 tablet, Refills: 0      omeprazole (PRILOSEC) 40 MG capsule Take 40 mg by mouth once daily.  Refills: 0           No discharge procedures on file.      Discharge Procedure Orders; resume previous diet and activity. Follow up as needed.

## 2017-09-29 NOTE — PROGRESS NOTES
Pt DC'd per MD order. Discharge instructions given including activity, wound care, S&S of infections, future appointments, and when to call MD. Medications reviewed including when to take next dose. PIV DC'd, catheter tip intact. Pt left unit via wheelchair with caregiver.

## 2017-09-29 NOTE — PLAN OF CARE
Problem: Patient Care Overview  Goal: Plan of Care Review  Outcome: Ongoing (interventions implemented as appropriate)  Received report from Brooke. Patient s/p fistulogram, AAOx3. VSS, no c/o pain or discomfort at this time, resp even and unlabored. Gauze/tegaderm dressing to L upper arm is CDI. No active bleeding. No hematoma noted. Post procedure protocol reviewed with patient and patient's caregiver. Understanding verbalized. Caregiver at bedside. Nurse call bell within reach. Will continue to monitor per post procedure protocol.

## 2017-09-29 NOTE — H&P
[facility]  History & Physical    Subjective:      Chief Complaint/Reason for Admission:  Prolonged bleeding after HD needle withdrawal.    Albina Garcia is a 85 y.o. female here with left BVT AVF  Prolonged bleeding after HD needle withdrawal.      Past Medical History:   Diagnosis Date    Anemia in CKD (chronic kidney disease) 10/14/2013    Arthritis     Back pain, chronic     Blood transfusion     CHF (congestive heart failure)     Diabetes mellitus     Elevated cholesterol     ESRD on hemodialysis     esrd    HTN (hypertension) 10/14/2013    Metabolic bone disease 10/14/2013     Past Surgical History:   Procedure Laterality Date    SPINAL FUSION      TOTAL ABDOMINAL HYSTERECTOMY      TOTAL KNEE ARTHROPLASTY Left     left     Family History   Problem Relation Age of Onset    Kidney disease Mother     Diabetes Sister      Social History   Substance Use Topics    Smoking status: Former Smoker    Smokeless tobacco: Never Used    Alcohol use No       PTA Medications   Medication Sig    acetaminophen (TYLENOL) 325 MG tablet Take 2 tablets (650 mg total) by mouth every 8 (eight) hours as needed for Pain or Temperature greater than (100.4 F).    aspirin 81 MG Chew Take 1 tablet (81 mg total) by mouth once daily.    atorvastatin (LIPITOR) 40 MG tablet Take 1 tablet (40 mg total) by mouth once daily.    calcium acetate (PHOSLO) 667 mg capsule TAKE 2 CAPSULES BY MOUTH 3 TIMES A DAY WITH MEALS    carvedilol (COREG) 6.25 MG tablet Take 1 tablet (6.25 mg total) by mouth 2 (two) times daily.    clopidogrel (PLAVIX) 75 mg tablet Take 1 tablet (75 mg total) by mouth once daily.    cyproheptadine (PERIACTIN) 4 mg tablet TAKE 1 TABLET EVERY DAY    hydrALAZINE (APRESOLINE) 100 MG tablet Take 1 tablet (100 mg total) by mouth 3 (three) times daily.    insulin aspart (NOVOLOG) 100 unit/mL InPn pen Inject 0-5 Units into the skin before meals and at bedtime as needed (Hyperglycemia).     irbesartan (AVAPRO) 300 MG tablet Take 300 mg by mouth once daily.    levoFLOXacin (LEVAQUIN) 250 MG tablet Take 1 tablet (250 mg total) by mouth every other day. FOR 10 MORE DAYS    meclizine (ANTIVERT) 25 mg tablet Take 1 tablet (25 mg total) by mouth 3 (three) times daily as needed for Dizziness.    polyvinyl alcohol, artificial tears, (LIQUIFILM TEARS) 1.4 % ophthalmic solution Place 1 drop into both eyes as needed.    propylene glycol (SYSTANE BALANCE) 0.6 % Drop Place 1 drop into both eyes 2 (two) times daily.    SENSIPAR 30 mg Tab TAKE 1 TABLET EVERY DAY    vitamin renal formula, B-complex-vitamin c-folic acid, (NEPHROCAP) 1 mg Cap Take 1 capsule by mouth once daily.    amlodipine (NORVASC) 10 MG tablet Take 1 tablet (10 mg total) by mouth once daily.    BD INSULIN SYRINGE ULTRA-FINE 0.5 mL 31 gauge x 5/16 Syrg USE TO INJECT INSULIN 2-3 TIMES A DAY    loperamide (IMODIUM A-D) 2 mg Tab Take 2 mg by mouth 4 (four) times daily as needed.    nitroGLYCERIN (NITROSTAT) 0.4 MG SL tablet Place 1 tablet (0.4 mg total) under the tongue every 5 (five) minutes as needed for Chest pain (maximum of 3 tablets in 15 minutes.).    omeprazole (PRILOSEC) 40 MG capsule Take 40 mg by mouth once daily.     Review of patient's allergies indicates:   Allergen Reactions    Penicillins         ROS Constitutional: No fever or chills, no weight changes.  Eyes: No visual changes or photophobia  HEENT: No nasal congestion or sore throat  Respiratory: No cough or shortness of breath  Cardiovascular: No chest pain or palpitations  Gastrointestinal: Good appetite, no nausea or vomiting, no change in bowel habits  Genitourinary: No hematuria or dysuria  Skin: No rash or pruritis  Hematologic/lymphatic: No easy bruising, bleeding or lymphadenopathy  Musculoskeletal: No arthralgias or myalgias  Neurological: No seizures or tremors  Endocrine: No heat/cold intolerance.  No polyuria/polydipsia.  Psychiatric:  No depression or  anxiety.    Objective:      Vital Signs (Most Recent)  Temp: 97.8 °F (36.6 °C) (09/29/17 0846)  Pulse: 76 (09/29/17 0846)  Resp: 17 (09/29/17 0846)  BP: (!) 199/84 (09/29/17 0846)  SpO2: (!) 94 % (09/29/17 0846)    Vital Signs Range (Last 24H):  Temp:  [97.8 °F (36.6 °C)]   Pulse:  [76]   Resp:  [17]   BP: (199)/(84)   SpO2:  [94 %]     Physical Exam   General appearance: Well developed, well nourished  Head: Normocephalic, atraumatic  Eyes:  Conjunctivae nl. Sclera anicteric. PERRL.  HEENT: Lips, mucosa, and tongue normal; teeth and gums normal and oropharynx clear.  Neck: Supple, trachea midline, thyroid not enlarged,   Lungs: Clear to auscultation bilaterally and normal respiratory effort  Heart: Regular rate and rhythm, S1, S2 normal, no murmur, click, rub or gallop  Abdomen: Soft, non-tender non-distended; bowel sounds normal; no masses,  no organomegaly  Extremities: No cyanosis or clubbing. No edema  Pulses: 2+ and symmetric  Skin: Skin color, texture, turgor normal. No rashes or lesions  Lymph nodes: Cervical, supraclavicular, and axillary nodes normal.  Neurologic: Normal strength and tone. No focal numbness or weakness  Psychiatric:  Alert and oriented times 3.  Affect appropriate.  Left BVT AVF; Good thrill at AA, water hammer pulse.high pitched discontnous systolic murmur.    Assessment:      Active Hospital Problems    Diagnosis  POA    Stenosis of arteriovenous dialysis fistula [T82.858A]  Yes      Resolved Hospital Problems    Diagnosis Date Resolved POA   No resolved problems to display.       Plan:    1. Fistulagram with PTA.

## 2017-09-29 NOTE — PROGRESS NOTES
09/29/17 1349   Vital Signs   BP (!) 180/76   BP Location Right arm   BP Method Automatic   Patient Position Sitting     Dr Malik notified of above. Instructed okay to discharge.

## 2017-10-02 NOTE — OP NOTE
DATE OF PROCEDURE:  09/29/2017    PROCEDURE TYPE:  Angiogram of her left basilic vein transposition fistula.    INDICATION:  Prolonged bleeding after dialysis.    :  Alpesh Malik M.D.    POSTPROCEDURE DIAGNOSES:  1.  Superior venacavogram.  2.  Subclavian venogram.  3.  Axillary subclavian junction of 50% stenosis followed by in-stent restenosis   and rest of the shunt was patent.  At this point, PTA of in stent  Re -stenosis   and subclavian axillary junction with 8 mm x 6 cm Lutonix balloon and prior to   that with 8 mm x 6 cm Throckmorton balloon.    PROCEDURE NOTE:  Size 7 mm x 6 cm Throckmorton balloon and reflux arteriogram.  No   evidence of inflow stenosis.    PROCEDURE NOTE IN DETAIL:  After informed consent was obtained from Ms. Garcia,   she was brought into Access Suite and placed in a supine position.  The area of   her left basilic vein transposition fistula was cleaned and draped in the usual   sterile fashion.  After achieving local anesthesia with lidocaine and   epinephrine, access was cannulated with a micropuncture needle in antegrade   direction using tactile sensation, the Mandril wire was advanced and a 5-Bahamian   sheath was established.  Multiple angiographic runs revealed there were patent   superior vena cava.  There was brachiocephalic vein stenosis 40% followed by   subclavian vein stenosis as subclavian axillary junction stenosis 50% followed   by in-stent restenosis and rest of the shunt was patent.  Given her significant   symptoms of bleeding, I decided to intervene the lesion.  Angled Glidewire was   advanced under fluoroscopy guidance into  SVC   Followed by that, 5-Bahamian sheath was   exchanged to a 7-Bahamian sheath.  Using a road map assistance, the subclavian   axillary vein was balloon angioplastied with Throckmorton balloon followed by followed   by 8 mm x 6 cm Lutonix drug-coated balloon.  It was left for 3 minutes per   protocol.  Followed by that instent restenosis, it was  angioplastied with  With 7   mm x 4 cm [place] balloon and 8 mm x 6 cm [place] balloon and balloon was   reflux arteriogram ,  There was no evidence of any inflow stenosis, patent   brachial artery.  Balloon and wire were removed and repeat angiogram revealed   there was brisk flow.  Upon examination of the access, the access cannulation zone   was much softer.  .  The 7-Kyrgyz sheath was removed after securing with 3-0   Prolene.  No immediate complications.  Estimated blood loss was 5 mL.  The   patient tolerated the procedure well and was discharged from Access Suite in   stable condition.      DRK/IN  dd: 09/29/2017 11:17:05 (CDT)  td: 09/30/2017 01:10:13 (CDT)  Doc ID   #[id number]  Job ID #[id number]    CC:

## 2017-12-19 ENCOUNTER — HOSPITAL ENCOUNTER (OUTPATIENT)
Dept: VASCULAR SURGERY | Facility: CLINIC | Age: 82
Discharge: HOME | End: 2017-12-19
Attending: SURGERY
Payer: MEDICARE

## 2017-12-19 ENCOUNTER — OFFICE VISIT (OUTPATIENT)
Dept: VASCULAR SURGERY | Facility: CLINIC | Age: 82
End: 2017-12-19
Attending: SURGERY
Payer: MEDICARE

## 2017-12-19 VITALS
BODY MASS INDEX: 23.22 KG/M2 | WEIGHT: 123 LBS | TEMPERATURE: 98 F | SYSTOLIC BLOOD PRESSURE: 187 MMHG | DIASTOLIC BLOOD PRESSURE: 66 MMHG | HEART RATE: 76 BPM | HEIGHT: 61 IN

## 2017-12-19 DIAGNOSIS — N18.6 ESRD (END STAGE RENAL DISEASE) ON DIALYSIS: ICD-10-CM

## 2017-12-19 DIAGNOSIS — Z99.2 ESRD (END STAGE RENAL DISEASE) ON DIALYSIS: ICD-10-CM

## 2017-12-19 DIAGNOSIS — Z99.2 ESRD (END STAGE RENAL DISEASE) ON DIALYSIS: Primary | ICD-10-CM

## 2017-12-19 DIAGNOSIS — N18.6 ESRD (END STAGE RENAL DISEASE) ON DIALYSIS: Primary | ICD-10-CM

## 2017-12-19 PROCEDURE — 99213 OFFICE O/P EST LOW 20 MIN: CPT | Mod: PBBFAC | Performed by: SURGERY

## 2017-12-19 PROCEDURE — 99213 OFFICE O/P EST LOW 20 MIN: CPT | Mod: S$PBB,,, | Performed by: SURGERY

## 2017-12-19 PROCEDURE — 93990 DOPPLER FLOW TESTING: CPT | Mod: PBBFAC | Performed by: SURGERY

## 2017-12-19 PROCEDURE — 99999 PR PBB SHADOW E&M-EST. PATIENT-LVL III: CPT | Mod: PBBFAC,,, | Performed by: SURGERY

## 2017-12-19 PROCEDURE — 93990 DOPPLER FLOW TESTING: CPT | Mod: 26,S$PBB,, | Performed by: SURGERY

## 2017-12-19 NOTE — PROGRESS NOTES
See my prior note; review of systems, family history and social history are   unchanged.    HISTORY OF PRESENT ILLNESS:  An 85-year-old female NHP with end-stage renal disease,   status post:  1.  Angioplasty, left brachiocephalic stenosis and stent placement and   subclavian vein angioplasty, 10/26/2016.  2.  Prior left basilic vein transposition four years ago at an outside   institution.    She now returns.  This is 4-month followup. No complaints    PHYSICAL EXAMINATION:  VITAL SIGNS:  See nursing note.  EXTREMITIES:  Left arm shows excellent thrill      IMAGING:  Duplex of the fistula shows no outflow stenosis and flow volume on 1.4 L    ASSESSMENT:  Well-functioning fistula    PLAN:  1. Lengthen surveillance   2.  Follow up in 6 months with surveillance duplex, sooner if clinically   Indicated.    JANNIE Andrade III, MD, FACS  Professor and Chief, Vascular and Endovascular Surgery

## 2018-01-09 PROBLEM — N39.0 URINARY TRACT INFECTION WITHOUT HEMATURIA: Status: RESOLVED | Noted: 2017-09-04 | Resolved: 2018-01-09

## 2018-02-05 PROBLEM — R79.89 ELEVATED TROPONIN: Status: ACTIVE | Noted: 2018-02-05

## 2018-02-05 PROBLEM — R74.8 ABNORMAL SERUM LIPASE LEVEL: Status: ACTIVE | Noted: 2018-02-05

## 2018-02-05 PROBLEM — I21.4 NSTEMI (NON-ST ELEVATED MYOCARDIAL INFARCTION): Status: RESOLVED | Noted: 2017-09-14 | Resolved: 2018-02-05

## 2018-02-05 PROBLEM — E83.39 HYPOPHOSPHATEMIA: Status: ACTIVE | Noted: 2018-02-05

## 2018-03-15 ENCOUNTER — TELEPHONE (OUTPATIENT)
Dept: GASTROENTEROLOGY | Facility: CLINIC | Age: 83
End: 2018-03-15

## 2018-03-15 ENCOUNTER — OFFICE VISIT (OUTPATIENT)
Dept: GASTROENTEROLOGY | Facility: CLINIC | Age: 83
End: 2018-03-15
Payer: MEDICARE

## 2018-03-15 ENCOUNTER — LAB VISIT (OUTPATIENT)
Dept: LAB | Facility: HOSPITAL | Age: 83
End: 2018-03-15
Attending: NURSE PRACTITIONER
Payer: MEDICARE

## 2018-03-15 VITALS — DIASTOLIC BLOOD PRESSURE: 59 MMHG | HEART RATE: 73 BPM | SYSTOLIC BLOOD PRESSURE: 141 MMHG

## 2018-03-15 DIAGNOSIS — R74.8 ELEVATED LIPASE: ICD-10-CM

## 2018-03-15 DIAGNOSIS — K21.9 GASTROESOPHAGEAL REFLUX DISEASE, ESOPHAGITIS PRESENCE NOT SPECIFIED: ICD-10-CM

## 2018-03-15 DIAGNOSIS — R74.8 ELEVATED LIPASE: Primary | ICD-10-CM

## 2018-03-15 LAB
ALBUMIN SERPL BCP-MCNC: 4 G/DL
ALP SERPL-CCNC: 244 U/L
ALT SERPL W/O P-5'-P-CCNC: 24 U/L
ANION GAP SERPL CALC-SCNC: 19 MMOL/L
AST SERPL-CCNC: 31 U/L
BILIRUB SERPL-MCNC: 0.9 MG/DL
BUN SERPL-MCNC: 43 MG/DL
CALCIUM SERPL-MCNC: 9.4 MG/DL
CHLORIDE SERPL-SCNC: 92 MMOL/L
CO2 SERPL-SCNC: 27 MMOL/L
CREAT SERPL-MCNC: 5.1 MG/DL
EST. GFR  (AFRICAN AMERICAN): 8 ML/MIN/1.73 M^2
EST. GFR  (NON AFRICAN AMERICAN): 7 ML/MIN/1.73 M^2
GLUCOSE SERPL-MCNC: 315 MG/DL
LIPASE SERPL-CCNC: 39 U/L
POTASSIUM SERPL-SCNC: 3.7 MMOL/L
PROT SERPL-MCNC: 7.6 G/DL
SODIUM SERPL-SCNC: 138 MMOL/L

## 2018-03-15 PROCEDURE — 99212 OFFICE O/P EST SF 10 MIN: CPT | Mod: PBBFAC,PO | Performed by: NURSE PRACTITIONER

## 2018-03-15 PROCEDURE — 99203 OFFICE O/P NEW LOW 30 MIN: CPT | Mod: S$PBB,,, | Performed by: NURSE PRACTITIONER

## 2018-03-15 PROCEDURE — 36415 COLL VENOUS BLD VENIPUNCTURE: CPT

## 2018-03-15 PROCEDURE — 83690 ASSAY OF LIPASE: CPT

## 2018-03-15 PROCEDURE — 80053 COMPREHEN METABOLIC PANEL: CPT

## 2018-03-15 PROCEDURE — 99999 PR PBB SHADOW E&M-EST. PATIENT-LVL II: CPT | Mod: PBBFAC,,, | Performed by: NURSE PRACTITIONER

## 2018-03-15 NOTE — TELEPHONE ENCOUNTER
Will try 300mg qhs as she is having all of her complaints at night.  Informed Isabel.   After trial fo higher nighttime dose, she can use whichever dosing is most effective and FU as scheduled.

## 2018-03-15 NOTE — TELEPHONE ENCOUNTER
Spoke with pts pharmacy. They needed clarification on the Zantac 300mg. They said that she is already taking Zantac 150mg TID. They need something in writing in order for her to stop the Zantac 150mg TID.

## 2018-03-15 NOTE — TELEPHONE ENCOUNTER
----- Message from WENDY Nair sent at 3/15/2018 12:51 PM CDT -----  Informed wesly at [facility] of lab results.

## 2018-03-15 NOTE — PROGRESS NOTES
"Subjective:       Patient ID: Albina Garcia is a 86 y.o. female.    Chief Complaint: Heartburn and Gastroesophageal Reflux    HPI  Recently hospitalized with complaints of chest pain.  Cardiac workup was negative.  She does have cardiac history with stents placed less than one year ago and is followed by Dr. Castano.    Her pain was described as burning and sour liquid into the mouth.  She was started on omeprazole daily and had improvement but not resolution.  Reports reflux and burning "comes and goes".  She notes complaints at night and admits she eats dinner and then typically goes to bed.  She lives at a nursing home.  Denies abdominal pain, vomiting, dysphagia.  Denies bowel changes, blood with bowel movements or black stools.  During admission lipase was noted to be elevated at 771.  Alk phos slightly elevated.  LFT and bilirubin normal  US unremarkable.    Review of Systems   Constitutional: Negative for activity change, appetite change, fatigue and fever.   HENT: Negative for trouble swallowing.    Respiratory: Positive for chest tightness. Negative for shortness of breath.    Gastrointestinal: Negative for abdominal distention, abdominal pain, blood in stool, constipation, diarrhea and vomiting.   Musculoskeletal: Positive for arthralgias.   Skin: Negative.    Neurological: Negative.    Psychiatric/Behavioral: Negative.        Objective:      Physical Exam   Constitutional: She is oriented to person, place, and time. She appears well-developed and well-nourished. No distress.   Eyes: No scleral icterus.   Cardiovascular: Normal rate.    Pulmonary/Chest: Effort normal. No respiratory distress.   Abdominal: Soft. She exhibits no distension. There is no tenderness. There is no guarding.   Neurological: She is alert and oriented to person, place, and time.   Skin: Skin is warm and dry. She is not diaphoretic.   Psychiatric: She has a normal mood and affect. Her behavior is normal. Judgment and thought content " normal.   Vitals reviewed.      Assessment:       1. Elevated lipase    2. Gastroesophageal reflux disease, esophagitis presence not specified        Plan:     Albina was seen today for heartburn and gastroesophageal reflux.    Diagnoses and all orders for this visit:    Elevated lipase  -     Comprehensive metabolic panel; Future  -     Lipase; Future    Gastroesophageal reflux disease, esophagitis presence not specified    Other orders  -     ranitidine (ZANTAC) 300 MG tablet; Take 1 tablet (300 mg total) by mouth every evening.      Will add ranitidine at night.  Discussed reflux prevention including elevating HOB at night and staying up for 3-4 hours after eating.    She is on plavix for heart stents within the last year.      We have discussed the option of EGD for evaluation.  She does not wish to schedule at this time.  She would not be able to hold plavix even if she was agreeable to proceed.    Will follow up in three months to discuss symptoms.     Any alarm symptoms, black stools or dysphagia would indicate need for more immediate consideration of endoscopy.  Otherwise, she does not wish to pursue.  We have discussed at length the risks and benefits of EGD in relation to her symptoms, age, other medical problems and plavix use.

## 2018-03-15 NOTE — TELEPHONE ENCOUNTER
----- Message from Helena Ramirez sent at 3/15/2018 11:22 AM CDT -----  Contact: Isabel meyers/[facility]   [phone]  Isabel need clarification on the patient prescription ranitidine (ZANTAC) 300 MG tablet  Please call and advise

## 2018-04-21 ENCOUNTER — HOSPITAL ENCOUNTER (INPATIENT)
Facility: HOSPITAL | Age: 83
LOS: 4 days | Discharge: NURSING, REHAB OR LONG-TERM CARE | DRG: 280 | End: 2018-04-25
Attending: FAMILY MEDICINE | Admitting: FAMILY MEDICINE
Payer: MEDICARE

## 2018-04-21 DIAGNOSIS — I21.4 NSTEMI (NON-ST ELEVATED MYOCARDIAL INFARCTION): Primary | ICD-10-CM

## 2018-04-21 DIAGNOSIS — Z71.89 GOALS OF CARE, COUNSELING/DISCUSSION: ICD-10-CM

## 2018-04-21 DIAGNOSIS — M54.50 LOW BACK PAIN, NON-SPECIFIC: ICD-10-CM

## 2018-04-21 DIAGNOSIS — Z71.89 COUNSELING REGARDING ADVANCED CARE PLANNING AND GOALS OF CARE: ICD-10-CM

## 2018-04-21 DIAGNOSIS — Z99.2 ESRD (END STAGE RENAL DISEASE) ON DIALYSIS: ICD-10-CM

## 2018-04-21 DIAGNOSIS — N18.6 ESRD (END STAGE RENAL DISEASE) ON DIALYSIS: ICD-10-CM

## 2018-04-21 DIAGNOSIS — I50.32 CHRONIC DIASTOLIC CONGESTIVE HEART FAILURE: Chronic | ICD-10-CM

## 2018-04-21 DIAGNOSIS — R79.89 ELEVATED TROPONIN: ICD-10-CM

## 2018-04-21 DIAGNOSIS — Z51.5 PALLIATIVE CARE ENCOUNTER: ICD-10-CM

## 2018-04-21 DIAGNOSIS — I25.119 CORONARY ARTERY DISEASE INVOLVING NATIVE CORONARY ARTERY OF NATIVE HEART WITH ANGINA PECTORIS: ICD-10-CM

## 2018-04-21 DIAGNOSIS — R79.89 ELEVATED TROPONIN LEVEL: ICD-10-CM

## 2018-04-21 LAB
ESTIMATED AVG GLUCOSE: 154 MG/DL
HBA1C MFR BLD HPLC: 7 %

## 2018-04-21 PROCEDURE — 93010 ELECTROCARDIOGRAM REPORT: CPT | Mod: ,,, | Performed by: INTERNAL MEDICINE

## 2018-04-21 PROCEDURE — 84484 ASSAY OF TROPONIN QUANT: CPT

## 2018-04-21 PROCEDURE — 83036 HEMOGLOBIN GLYCOSYLATED A1C: CPT

## 2018-04-21 PROCEDURE — 94761 N-INVAS EAR/PLS OXIMETRY MLT: CPT

## 2018-04-21 PROCEDURE — 93005 ELECTROCARDIOGRAM TRACING: CPT

## 2018-04-21 PROCEDURE — 12000002 HC ACUTE/MED SURGE SEMI-PRIVATE ROOM

## 2018-04-21 PROCEDURE — G0379 DIRECT REFER HOSPITAL OBSERV: HCPCS

## 2018-04-21 PROCEDURE — 36415 COLL VENOUS BLD VENIPUNCTURE: CPT

## 2018-04-21 PROCEDURE — G0378 HOSPITAL OBSERVATION PER HR: HCPCS

## 2018-04-21 RX ORDER — ATORVASTATIN CALCIUM 40 MG/1
40 TABLET, FILM COATED ORAL NIGHTLY
Status: DISCONTINUED | OUTPATIENT
Start: 2018-04-21 | End: 2018-04-25 | Stop reason: HOSPADM

## 2018-04-21 RX ORDER — IRBESARTAN 150 MG/1
300 TABLET ORAL DAILY
Status: DISCONTINUED | OUTPATIENT
Start: 2018-04-22 | End: 2018-04-25 | Stop reason: HOSPADM

## 2018-04-21 RX ORDER — GUAIFENESIN/DEXTROMETHORPHAN 100-10MG/5
5 SYRUP ORAL EVERY 4 HOURS PRN
Status: DISCONTINUED | OUTPATIENT
Start: 2018-04-21 | End: 2018-04-25 | Stop reason: HOSPADM

## 2018-04-21 RX ORDER — SODIUM CHLORIDE 0.9 % (FLUSH) 0.9 %
5 SYRINGE (ML) INJECTION
Status: DISCONTINUED | OUTPATIENT
Start: 2018-04-21 | End: 2018-04-25 | Stop reason: HOSPADM

## 2018-04-21 RX ORDER — NAPROXEN SODIUM 220 MG/1
81 TABLET, FILM COATED ORAL DAILY
Status: DISCONTINUED | OUTPATIENT
Start: 2018-04-22 | End: 2018-04-25 | Stop reason: HOSPADM

## 2018-04-21 RX ORDER — CARVEDILOL 6.25 MG/1
6.25 TABLET ORAL 2 TIMES DAILY
Status: DISCONTINUED | OUTPATIENT
Start: 2018-04-21 | End: 2018-04-25 | Stop reason: HOSPADM

## 2018-04-21 RX ORDER — INSULIN ASPART 100 [IU]/ML
0-5 INJECTION, SOLUTION INTRAVENOUS; SUBCUTANEOUS
Status: DISCONTINUED | OUTPATIENT
Start: 2018-04-21 | End: 2018-04-25

## 2018-04-21 RX ORDER — ACETAMINOPHEN 325 MG/1
650 TABLET ORAL EVERY 8 HOURS PRN
Status: DISCONTINUED | OUTPATIENT
Start: 2018-04-21 | End: 2018-04-25 | Stop reason: HOSPADM

## 2018-04-21 RX ORDER — ONDANSETRON 2 MG/ML
4 INJECTION INTRAMUSCULAR; INTRAVENOUS EVERY 6 HOURS PRN
Status: DISCONTINUED | OUTPATIENT
Start: 2018-04-21 | End: 2018-04-25 | Stop reason: HOSPADM

## 2018-04-21 RX ORDER — HEPARIN SODIUM 5000 [USP'U]/ML
5000 INJECTION, SOLUTION INTRAVENOUS; SUBCUTANEOUS EVERY 8 HOURS
Status: DISCONTINUED | OUTPATIENT
Start: 2018-04-21 | End: 2018-04-25 | Stop reason: HOSPADM

## 2018-04-21 RX ORDER — IBUPROFEN 200 MG
24 TABLET ORAL
Status: DISCONTINUED | OUTPATIENT
Start: 2018-04-21 | End: 2018-04-25

## 2018-04-21 RX ORDER — NITROGLYCERIN 0.4 MG/1
0.4 TABLET SUBLINGUAL EVERY 5 MIN PRN
Status: DISCONTINUED | OUTPATIENT
Start: 2018-04-21 | End: 2018-04-25 | Stop reason: HOSPADM

## 2018-04-21 RX ORDER — CINACALCET 30 MG/1
30 TABLET, FILM COATED ORAL DAILY
Status: DISCONTINUED | OUTPATIENT
Start: 2018-04-22 | End: 2018-04-25 | Stop reason: HOSPADM

## 2018-04-21 RX ORDER — IBUPROFEN 200 MG
16 TABLET ORAL
Status: DISCONTINUED | OUTPATIENT
Start: 2018-04-21 | End: 2018-04-25

## 2018-04-21 RX ORDER — CALCIUM ACETATE 667 MG/1
1334 CAPSULE ORAL
Status: DISCONTINUED | OUTPATIENT
Start: 2018-04-22 | End: 2018-04-23

## 2018-04-21 RX ORDER — IPRATROPIUM BROMIDE AND ALBUTEROL SULFATE 2.5; .5 MG/3ML; MG/3ML
3 SOLUTION RESPIRATORY (INHALATION) EVERY 6 HOURS PRN
Status: DISCONTINUED | OUTPATIENT
Start: 2018-04-21 | End: 2018-04-22

## 2018-04-21 RX ORDER — DIPHENHYDRAMINE HYDROCHLORIDE 50 MG/ML
25 INJECTION INTRAMUSCULAR; INTRAVENOUS EVERY 6 HOURS PRN
Status: DISCONTINUED | OUTPATIENT
Start: 2018-04-22 | End: 2018-04-25 | Stop reason: HOSPADM

## 2018-04-21 RX ORDER — PANTOPRAZOLE SODIUM 40 MG/1
40 TABLET, DELAYED RELEASE ORAL DAILY
Status: DISCONTINUED | OUTPATIENT
Start: 2018-04-22 | End: 2018-04-25 | Stop reason: HOSPADM

## 2018-04-21 RX ORDER — GLUCAGON 1 MG
1 KIT INJECTION
Status: DISCONTINUED | OUTPATIENT
Start: 2018-04-21 | End: 2018-04-25

## 2018-04-21 RX ORDER — HYDRALAZINE HYDROCHLORIDE 25 MG/1
100 TABLET, FILM COATED ORAL 3 TIMES DAILY
Status: DISCONTINUED | OUTPATIENT
Start: 2018-04-22 | End: 2018-04-25 | Stop reason: HOSPADM

## 2018-04-21 RX ORDER — CLOPIDOGREL BISULFATE 75 MG/1
75 TABLET ORAL DAILY
Status: DISCONTINUED | OUTPATIENT
Start: 2018-04-22 | End: 2018-04-25 | Stop reason: HOSPADM

## 2018-04-22 PROBLEM — N30.00 ACUTE CYSTITIS WITHOUT HEMATURIA: Status: ACTIVE | Noted: 2018-04-22

## 2018-04-22 LAB
ALBUMIN SERPL BCP-MCNC: 3.5 G/DL
ALP SERPL-CCNC: 295 U/L
ALT SERPL W/O P-5'-P-CCNC: 21 U/L
ANION GAP SERPL CALC-SCNC: 16 MMOL/L
AST SERPL-CCNC: 31 U/L
BASOPHILS # BLD AUTO: 0.03 K/UL
BASOPHILS NFR BLD: 0.3 %
BILIRUB SERPL-MCNC: 0.7 MG/DL
BUN SERPL-MCNC: 46 MG/DL
CALCIUM SERPL-MCNC: 9.4 MG/DL
CHLORIDE SERPL-SCNC: 91 MMOL/L
CHOLEST SERPL-MCNC: 102 MG/DL
CHOLEST/HDLC SERPL: 2.3 {RATIO}
CO2 SERPL-SCNC: 25 MMOL/L
CREAT SERPL-MCNC: 5.4 MG/DL
DIFFERENTIAL METHOD: ABNORMAL
EOSINOPHIL # BLD AUTO: 0 K/UL
EOSINOPHIL NFR BLD: 0.2 %
ERYTHROCYTE [DISTWIDTH] IN BLOOD BY AUTOMATED COUNT: 17.2 %
EST. GFR  (AFRICAN AMERICAN): 8 ML/MIN/1.73 M^2
EST. GFR  (NON AFRICAN AMERICAN): 7 ML/MIN/1.73 M^2
GLUCOSE SERPL-MCNC: 332 MG/DL
HCT VFR BLD AUTO: 32.2 %
HDLC SERPL-MCNC: 45 MG/DL
HDLC SERPL: 44.1 %
HGB BLD-MCNC: 10 G/DL
LDLC SERPL CALC-MCNC: 44 MG/DL
LYMPHOCYTES # BLD AUTO: 0.6 K/UL
LYMPHOCYTES NFR BLD: 6.8 %
MAGNESIUM SERPL-MCNC: 2 MG/DL
MCH RBC QN AUTO: 28.8 PG
MCHC RBC AUTO-ENTMCNC: 31.1 G/DL
MCV RBC AUTO: 93 FL
MONOCYTES # BLD AUTO: 1.2 K/UL
MONOCYTES NFR BLD: 12.6 %
NEUTROPHILS # BLD AUTO: 7.4 K/UL
NEUTROPHILS NFR BLD: 80 %
NONHDLC SERPL-MCNC: 57 MG/DL
PHOSPHATE SERPL-MCNC: 2.9 MG/DL
PLATELET # BLD AUTO: 207 K/UL
PMV BLD AUTO: 11.4 FL
POCT GLUCOSE: 311 MG/DL (ref 70–110)
POCT GLUCOSE: 315 MG/DL (ref 70–110)
POCT GLUCOSE: 344 MG/DL (ref 70–110)
POTASSIUM SERPL-SCNC: 4.5 MMOL/L
PROCALCITONIN SERPL IA-MCNC: 1.87 NG/ML
PROT SERPL-MCNC: 7.5 G/DL
RBC # BLD AUTO: 3.47 M/UL
SODIUM SERPL-SCNC: 132 MMOL/L
TRIGL SERPL-MCNC: 65 MG/DL
TROPONIN I SERPL DL<=0.01 NG/ML-MCNC: 1.2 NG/ML
TROPONIN I SERPL DL<=0.01 NG/ML-MCNC: 1.94 NG/ML
WBC # BLD AUTO: 9.27 K/UL

## 2018-04-22 PROCEDURE — 99223 1ST HOSP IP/OBS HIGH 75: CPT | Mod: ,,, | Performed by: INTERNAL MEDICINE

## 2018-04-22 PROCEDURE — 25000003 PHARM REV CODE 250: Performed by: STUDENT IN AN ORGANIZED HEALTH CARE EDUCATION/TRAINING PROGRAM

## 2018-04-22 PROCEDURE — 84100 ASSAY OF PHOSPHORUS: CPT

## 2018-04-22 PROCEDURE — 27000646 HC AEROBIKA DEVICE

## 2018-04-22 PROCEDURE — 63600175 PHARM REV CODE 636 W HCPCS: Performed by: STUDENT IN AN ORGANIZED HEALTH CARE EDUCATION/TRAINING PROGRAM

## 2018-04-22 PROCEDURE — 93005 ELECTROCARDIOGRAM TRACING: CPT

## 2018-04-22 PROCEDURE — 84145 PROCALCITONIN (PCT): CPT

## 2018-04-22 PROCEDURE — 36415 COLL VENOUS BLD VENIPUNCTURE: CPT

## 2018-04-22 PROCEDURE — 84484 ASSAY OF TROPONIN QUANT: CPT

## 2018-04-22 PROCEDURE — 25000242 PHARM REV CODE 250 ALT 637 W/ HCPCS: Performed by: STUDENT IN AN ORGANIZED HEALTH CARE EDUCATION/TRAINING PROGRAM

## 2018-04-22 PROCEDURE — 25000242 PHARM REV CODE 250 ALT 637 W/ HCPCS: Performed by: FAMILY MEDICINE

## 2018-04-22 PROCEDURE — 94761 N-INVAS EAR/PLS OXIMETRY MLT: CPT

## 2018-04-22 PROCEDURE — 94640 AIRWAY INHALATION TREATMENT: CPT

## 2018-04-22 PROCEDURE — 80053 COMPREHEN METABOLIC PANEL: CPT

## 2018-04-22 PROCEDURE — 85025 COMPLETE CBC W/AUTO DIFF WBC: CPT

## 2018-04-22 PROCEDURE — 94664 DEMO&/EVAL PT USE INHALER: CPT

## 2018-04-22 PROCEDURE — 99900035 HC TECH TIME PER 15 MIN (STAT)

## 2018-04-22 PROCEDURE — 93010 ELECTROCARDIOGRAM REPORT: CPT | Mod: ,,, | Performed by: INTERNAL MEDICINE

## 2018-04-22 PROCEDURE — 80100016 HC MAINTENANCE HEMODIALYSIS

## 2018-04-22 PROCEDURE — 12000002 HC ACUTE/MED SURGE SEMI-PRIVATE ROOM

## 2018-04-22 PROCEDURE — 83735 ASSAY OF MAGNESIUM: CPT

## 2018-04-22 PROCEDURE — 80061 LIPID PANEL: CPT

## 2018-04-22 RX ORDER — IPRATROPIUM BROMIDE AND ALBUTEROL SULFATE 2.5; .5 MG/3ML; MG/3ML
3 SOLUTION RESPIRATORY (INHALATION) EVERY 4 HOURS
Status: DISCONTINUED | OUTPATIENT
Start: 2018-04-22 | End: 2018-04-22

## 2018-04-22 RX ORDER — IPRATROPIUM BROMIDE AND ALBUTEROL SULFATE 2.5; .5 MG/3ML; MG/3ML
3 SOLUTION RESPIRATORY (INHALATION) EVERY 4 HOURS
Status: DISCONTINUED | OUTPATIENT
Start: 2018-04-22 | End: 2018-04-25 | Stop reason: HOSPADM

## 2018-04-22 RX ORDER — SODIUM CHLORIDE 9 MG/ML
INJECTION, SOLUTION INTRAVENOUS ONCE
Status: DISCONTINUED | OUTPATIENT
Start: 2018-04-22 | End: 2018-04-25 | Stop reason: HOSPADM

## 2018-04-22 RX ORDER — SODIUM CHLORIDE 9 MG/ML
INJECTION, SOLUTION INTRAVENOUS
Status: DISCONTINUED | OUTPATIENT
Start: 2018-04-22 | End: 2018-04-25 | Stop reason: HOSPADM

## 2018-04-22 RX ORDER — LIDOCAINE 50 MG/G
2 PATCH TOPICAL
Status: DISCONTINUED | OUTPATIENT
Start: 2018-04-22 | End: 2018-04-25

## 2018-04-22 RX ORDER — SODIUM CHLORIDE 9 MG/ML
INJECTION, SOLUTION INTRAVENOUS ONCE
Status: COMPLETED | OUTPATIENT
Start: 2018-04-22 | End: 2018-04-25

## 2018-04-22 RX ORDER — FUROSEMIDE 10 MG/ML
20 INJECTION INTRAMUSCULAR; INTRAVENOUS ONCE
Status: COMPLETED | OUTPATIENT
Start: 2018-04-22 | End: 2018-04-22

## 2018-04-22 RX ADMIN — IPRATROPIUM BROMIDE AND ALBUTEROL SULFATE 3 ML: .5; 3 SOLUTION RESPIRATORY (INHALATION) at 08:04

## 2018-04-22 RX ADMIN — IPRATROPIUM BROMIDE AND ALBUTEROL SULFATE 3 ML: .5; 3 SOLUTION RESPIRATORY (INHALATION) at 03:04

## 2018-04-22 RX ADMIN — CARVEDILOL 6.25 MG: 6.25 TABLET, FILM COATED ORAL at 12:04

## 2018-04-22 RX ADMIN — FUROSEMIDE 20 MG: 10 INJECTION, SOLUTION INTRAMUSCULAR; INTRAVENOUS at 08:04

## 2018-04-22 RX ADMIN — ATORVASTATIN CALCIUM 40 MG: 40 TABLET, FILM COATED ORAL at 09:04

## 2018-04-22 RX ADMIN — CARVEDILOL 6.25 MG: 6.25 TABLET, FILM COATED ORAL at 08:04

## 2018-04-22 RX ADMIN — HEPARIN SODIUM 5000 UNITS: 5000 INJECTION, SOLUTION INTRAVENOUS; SUBCUTANEOUS at 11:04

## 2018-04-22 RX ADMIN — INSULIN ASPART 4 UNITS: 100 INJECTION, SOLUTION INTRAVENOUS; SUBCUTANEOUS at 05:04

## 2018-04-22 RX ADMIN — LIDOCAINE 2 PATCH: 50 PATCH TOPICAL at 10:04

## 2018-04-22 RX ADMIN — HEPARIN SODIUM 5000 UNITS: 5000 INJECTION, SOLUTION INTRAVENOUS; SUBCUTANEOUS at 12:04

## 2018-04-22 RX ADMIN — ASPIRIN 81 MG 81 MG: 81 TABLET ORAL at 08:04

## 2018-04-22 RX ADMIN — PANTOPRAZOLE SODIUM 40 MG: 40 TABLET, DELAYED RELEASE ORAL at 08:04

## 2018-04-22 RX ADMIN — IRBESARTAN 300 MG: 150 TABLET ORAL at 08:04

## 2018-04-22 RX ADMIN — HYPROMELLOSE 2910 2 DROP: 5 SOLUTION OPHTHALMIC at 08:04

## 2018-04-22 RX ADMIN — CEFTRIAXONE SODIUM 1 G: 1 INJECTION, POWDER, FOR SOLUTION INTRAMUSCULAR; INTRAVENOUS at 10:04

## 2018-04-22 RX ADMIN — INSULIN ASPART 2 UNITS: 100 INJECTION, SOLUTION INTRAVENOUS; SUBCUTANEOUS at 09:04

## 2018-04-22 RX ADMIN — HYDRALAZINE HYDROCHLORIDE 100 MG: 25 TABLET, FILM COATED ORAL at 11:04

## 2018-04-22 RX ADMIN — HYDRALAZINE HYDROCHLORIDE 100 MG: 25 TABLET, FILM COATED ORAL at 08:04

## 2018-04-22 RX ADMIN — CEFTRIAXONE SODIUM 1 G: 1 INJECTION, POWDER, FOR SOLUTION INTRAMUSCULAR; INTRAVENOUS at 12:04

## 2018-04-22 RX ADMIN — HEPARIN SODIUM 5000 UNITS: 5000 INJECTION, SOLUTION INTRAVENOUS; SUBCUTANEOUS at 08:04

## 2018-04-22 RX ADMIN — HYDRALAZINE HYDROCHLORIDE 100 MG: 25 TABLET, FILM COATED ORAL at 04:04

## 2018-04-22 RX ADMIN — GUAIFENESIN AND DEXTROMETHORPHAN 5 ML: 100; 10 SYRUP ORAL at 09:04

## 2018-04-22 RX ADMIN — Medication 1 CAPSULE: at 08:04

## 2018-04-22 RX ADMIN — CARVEDILOL 6.25 MG: 6.25 TABLET, FILM COATED ORAL at 11:04

## 2018-04-22 RX ADMIN — HEPARIN SODIUM 5000 UNITS: 5000 INJECTION, SOLUTION INTRAVENOUS; SUBCUTANEOUS at 04:04

## 2018-04-22 RX ADMIN — HYPROMELLOSE 2910 2 DROP: 5 SOLUTION OPHTHALMIC at 12:04

## 2018-04-22 RX ADMIN — HYPROMELLOSE 2910 2 DROP: 5 SOLUTION OPHTHALMIC at 05:04

## 2018-04-22 RX ADMIN — CLOPIDOGREL BISULFATE 75 MG: 75 TABLET ORAL at 08:04

## 2018-04-22 RX ADMIN — CALCIUM ACETATE 1334 MG: 667 CAPSULE ORAL at 08:04

## 2018-04-22 RX ADMIN — CALCIUM ACETATE 1334 MG: 667 CAPSULE ORAL at 05:04

## 2018-04-22 RX ADMIN — ATORVASTATIN CALCIUM 40 MG: 40 TABLET, FILM COATED ORAL at 12:04

## 2018-04-22 RX ADMIN — CINACALCET HYDROCHLORIDE 30 MG: 30 TABLET, COATED ORAL at 08:04

## 2018-04-22 NOTE — SUBJECTIVE & OBJECTIVE
Past Medical History:   Diagnosis Date    Anemia in CKD (chronic kidney disease) 10/14/2013    Arthritis     Back pain, chronic     Blood transfusion     CHF (congestive heart failure)     Diabetes mellitus     Elevated cholesterol     ESRD on hemodialysis     esrd    HTN (hypertension) 10/14/2013    Metabolic bone disease 10/14/2013     Past Surgical History:   Procedure Laterality Date    SPINAL FUSION      TOTAL ABDOMINAL HYSTERECTOMY      TOTAL KNEE ARTHROPLASTY Left     left     Review of patient's allergies indicates:   Allergen Reactions    Penicillins      Current Facility-Administered Medications on File Prior to Encounter   Medication    [COMPLETED] albuterol-ipratropium 2.5mg-0.5mg/3mL nebulizer solution 6 mL    [COMPLETED] aspirin tablet 325 mg    [COMPLETED] furosemide injection 40 mg    [COMPLETED] levoFLOXacin 250 mg/50 mL IVPB 250 mg     Current Outpatient Prescriptions on File Prior to Encounter   Medication Sig    acetaminophen (TYLENOL) 325 MG tablet Take 2 tablets (650 mg total) by mouth every 8 (eight) hours as needed for Pain or Temperature greater than (100.4 F).    aspirin 81 MG Chew Take 1 tablet (81 mg total) by mouth once daily.    atorvastatin (LIPITOR) 40 MG tablet Take 1 tablet (40 mg total) by mouth once daily. (Patient taking differently: Take 40 mg by mouth nightly. )    BD INSULIN SYRINGE ULTRA-FINE 0.5 mL 31 gauge x 5/16 Syrg USE TO INJECT INSULIN 2-3 TIMES A DAY    calcium acetate (PHOSLO) 667 mg capsule TAKE 2 CAPSULES BY MOUTH 3 TIMES A DAY WITH MEALS    carvedilol (COREG) 6.25 MG tablet Take 1 tablet (6.25 mg total) by mouth 2 (two) times daily.    clopidogrel (PLAVIX) 75 mg tablet Take 1 tablet (75 mg total) by mouth once daily.    dextromethorphan-guaifenesin  mg Tab Take 1 tablet by mouth every 8 (eight) hours as needed.    DICYCLOMINE HCL (GI COCKTAIL SIMPLE RECORD) GI Cocktail (Mylanta 30 mL, 2% viscous lidocaine 10 mL, dicyclomine 10 mL)     hydrALAZINE (APRESOLINE) 100 MG tablet Take 1 tablet (100 mg total) by mouth 3 (three) times daily.    insulin aspart (NOVOLOG) 100 unit/mL InPn pen Inject 0-5 Units into the skin before meals and at bedtime as needed (Hyperglycemia).    irbesartan (AVAPRO) 300 MG tablet Take 300 mg by mouth once daily.    loperamide (IMODIUM A-D) 2 mg Tab Take 2 mg by mouth 4 (four) times daily as needed.    meclizine (ANTIVERT) 25 mg tablet Take 1 tablet (25 mg total) by mouth 3 (three) times daily as needed for Dizziness.    nitroGLYCERIN (NITROSTAT) 0.4 MG SL tablet Place 1 tablet (0.4 mg total) under the tongue every 5 (five) minutes as needed for Chest pain (maximum of 3 tablets in 15 minutes.).    omeprazole (PRILOSEC) 40 MG capsule Take 40 mg by mouth once daily.    polyvinyl alcohol, artificial tears, (LIQUIFILM TEARS) 1.4 % ophthalmic solution Place 1 drop into both eyes as needed.    propylene glycol (SYSTANE BALANCE) 0.6 % Drop Place 1 drop into both eyes 2 (two) times daily.    ranitidine (ZANTAC) 300 MG tablet Take 1 tablet (300 mg total) by mouth every evening.    SENSIPAR 30 mg Tab TAKE 1 TABLET EVERY DAY    vitamin renal formula, B-complex-vitamin c-folic acid, (NEPHROCAP) 1 mg Cap Take 1 capsule by mouth once daily.    white petrolatum-mineral oil 57.3-42.5% (REFRESH P.M.) 57.3-42.5 % Oint every evening.     Family History     Problem Relation (Age of Onset)    Diabetes Sister    Kidney disease Mother        Social History Main Topics    Smoking status: Former Smoker    Smokeless tobacco: Never Used    Alcohol use No    Drug use: No    Sexual activity: Not on file     Review of Systems   Constitutional: Positive for appetite change (decreased since going [place] and fatigue. Negative for diaphoresis and fever.        Malaise   Eyes: Positive for pain (dry eye (r)).   Respiratory: Negative for shortness of breath and wheezing.    Cardiovascular: Negative for chest pain.   Gastrointestinal:  "Negative for abdominal pain, constipation, diarrhea, nausea and vomiting.   Genitourinary: Negative for dysuria.   Musculoskeletal: Negative for gait problem.   Neurological: Positive for weakness. Negative for seizures and headaches.   Psychiatric/Behavioral: Negative for sleep disturbance.   All other systems reviewed and are negative.     Objective:     Vital Signs (Most Recent):  Pulse: 94 (04/22/18 0000)  BP: (!) 156/69 (04/22/18 0000)  SpO2: 98 % (04/21/18 2350) Vital Signs (24h Range):  Temp:  [98 °F (36.7 °C)-98.5 °F (36.9 °C)] 98 °F (36.7 °C)  Pulse:  [] 94  Resp:  [18-26] 18  SpO2:  [98 %-100 %] 98 %  BP: (140-156)/(61-95) 156/69     Physical Exam   Constitutional: She is oriented to person, place, and time. No distress.   Appears stated age, elderly, frail   HENT:   Head: Normocephalic and atraumatic.   Mouth/Throat: No oropharyngeal exudate.   Eyes:   Right eye kept shut: "dry"   Neck: Normal range of motion. Neck supple. No JVD present.   Cardiovascular: Normal rate, regular rhythm, normal heart sounds and intact distal pulses.    No murmur heard.  Left upper arm fistula w/ good thrill   Pulmonary/Chest: Effort normal. No respiratory distress. She has no wheezes. She has rales.   Frequent cough   Abdominal: Soft. Bowel sounds are normal. She exhibits no distension and no mass. There is no tenderness.   Musculoskeletal: Normal range of motion. She exhibits no edema or deformity.   Neurological: She is alert and oriented to person, place, and time. No cranial nerve deficit.   Skin: Capillary refill takes less than 2 seconds. She is not diaphoretic.   Psychiatric: She has a normal mood and affect. Her behavior is normal.   Nursing note and vitals reviewed.       Significant Labs:   CBC:   Recent Labs  Lab 04/21/18 1813   WBC 10.51   HGB 9.8*   HCT 31.7*        CMP:   Recent Labs  Lab 04/21/18 1813   *   K 4.0   CL 91*   CO2 29   *   BUN 42*   CREATININE 4.40*   CALCIUM 8.9 " "  PROT 7.4   ALBUMIN 4.2   BILITOT 0.7   ALKPHOS 297*   AST 39   ALT 23   ANIONGAP 15   EGFRNONAA 8.5*     Troponin:   Recent Labs  Lab 04/21/18  1813 04/21/18  2308   TROPONINI 0.547* 1.199*     Urine Studies:   Recent Labs  Lab 04/21/18  1826   COLORU Yellow   APPEARANCEUA Cloudy*   PHUR 7.0   SPECGRAV 1.025   PROTEINUA 3+*   GLUCUA Negative   KETONESU Negative   BILIRUBINUA Negative   OCCULTUA 2+*   NITRITE Negative   UROBILINOGEN Negative   LEUKOCYTESUR 2+*   RBCUA 3   WBCUA >100*   BACTERIA Many*   HYALINECASTS 0       Significant Imaging:  CXR: "Asymmetric interstitial opacities involving lung bases and left tenzin-hilar region. Edema or infection would have this appearance. Consider BMP"  "

## 2018-04-22 NOTE — ASSESSMENT & PLAN NOTE
Trop in ED 0.547 > 1.199 (baseline 0.03)  No chest pain currently  EKG with no ST changes, but + for flipped T waves in inferior leads  Cardiology consulted, will see pt in AM

## 2018-04-22 NOTE — ASSESSMENT & PLAN NOTE
Sputum color change from white to yellow, non-smoker  In nursing home, so potentially would need to cover for HAP  Xray demonstrates bilat infiltrates vs edema  Given dose of levo x 1 in ED.  On rocephin x 5 days for UTI + possible PNA (was taking Z-armani)

## 2018-04-22 NOTE — PT/OT/SLP PROGRESS
"Physical Therapy      Patient Name:  Albina Garcia   MRN:  [id number]    Patient not seen today secondary to pt refused in AM, pt focused on "breathing thing one of those people were going to bring her".  Spoke w nsg who went to assist pt.  Followed up in PM with second attempt but pt in HD.   Will follow-up as able.    Elham Sams, PT    "

## 2018-04-22 NOTE — ASSESSMENT & PLAN NOTE
BNP > 70,000 but with no leg swelling and some wheezing  Last ECHO was :    EF of 55%   mild mitral valve regurg   diastolic dysfunction   small pericardial effusion   mild tricuspid regurg  Avoiding IVF fluids for now as CXR demonstrates likely overload  BP stable, albeit slightly high On irbesartan 200 mg daily, hydarlazine 100 mg po TID  Lipids:   TC: 173   Tri   HDL: 36   LDLca

## 2018-04-22 NOTE — ASSESSMENT & PLAN NOTE
Urine 2+ leuks, >100 WBCs, with many bacteria, nitrite neg  Urine culture pending, with susceptibilities  Avoiding nitrofurantin and bactrim due to geriatric population and renal disease  Giving 5 days of Rocephin for additional respiratory coverage

## 2018-04-22 NOTE — ASSESSMENT & PLAN NOTE
MWF Dialysis  Cr: 4.4, GFR: 9.8  Completed full dialysis Friday  Will likely consult Nephrology in case pt needs to be dialyzed while in-patient

## 2018-04-22 NOTE — PLAN OF CARE
Plan of care reviewed with patient, understanding verbalized.  Pt remains SR on tele. No complaints overnight. Bed alarm on, call light in reach, fall precautions in place. Will continue to monitor.

## 2018-04-22 NOTE — H&P
[facility] Hospital Medicine  History & Physical    Patient Name: Albina Garcia  MRN: [id number]  Admission Date: 4/21/2018  Attending Physician: Braeden Holman  Primary Care Provider: Judie Dsouza MD      Patient information was obtained from patient, relative(s) and ER records.     Subjective:     Principal Problem:Elevated troponin level    Chief Complaint: Weakness and malaise     HPI: 85 yo F w/ ESRD on HD, CAD (s/p PCI 5/17 and stent placement in 9/17 w/ Dr. Castano), HTN, GERD, DM (HgbA1C 6.3- on diet control), chronic diastolic HF [facility] for elevated troponin.  Original EMS call placed from nursing home was for SOB, but pt now reports only fatigue and overall malaise.  She and her daughter state this has been present to 2 weeks, but she has had decreased PO intake for 9 months since going to the nursing home.    Dialysis is MWF.  She was dialyzed yesterday for a full treatment with no complications.  Denies chest pain although trop is elevated beyond her baseline mild elevation.  EKG shows no ST changes.    She also reports a productive cough.  Sputum had been white and frothy, but today was yellow.  Roommate at nursing home also with cough.  Had been taking Z-armani.  Denies fevers, chills, abdom pain, N/V.   Complains of dry right eye chronically.    Pt admitted for ACS rule-out.  Also, found to have UTI on UA. Culture pending.  Will treat empirically and follow cultures, though has PCN allergy and renal disease. Currently no chest pain.      ED talked with cardiology and pt will evaluated further by them in the AM.    Past Medical History:   Diagnosis Date    Anemia in CKD (chronic kidney disease) 10/14/2013    Arthritis     Back pain, chronic     Blood transfusion     CHF (congestive heart failure)     Diabetes mellitus     Elevated cholesterol     ESRD on hemodialysis     esrd    HTN (hypertension) 10/14/2013    Metabolic bone disease  10/14/2013     Past Surgical History:   Procedure Laterality Date    SPINAL FUSION      TOTAL ABDOMINAL HYSTERECTOMY      TOTAL KNEE ARTHROPLASTY Left     left     Review of patient's allergies indicates:   Allergen Reactions    Penicillins      Current Facility-Administered Medications on File Prior to Encounter   Medication    [COMPLETED] albuterol-ipratropium 2.5mg-0.5mg/3mL nebulizer solution 6 mL    [COMPLETED] aspirin tablet 325 mg    [COMPLETED] furosemide injection 40 mg    [COMPLETED] levoFLOXacin 250 mg/50 mL IVPB 250 mg     Current Outpatient Prescriptions on File Prior to Encounter   Medication Sig    acetaminophen (TYLENOL) 325 MG tablet Take 2 tablets (650 mg total) by mouth every 8 (eight) hours as needed for Pain or Temperature greater than (100.4 F).    aspirin 81 MG Chew Take 1 tablet (81 mg total) by mouth once daily.    atorvastatin (LIPITOR) 40 MG tablet Take 1 tablet (40 mg total) by mouth once daily. (Patient taking differently: Take 40 mg by mouth nightly. )    BD INSULIN SYRINGE ULTRA-FINE 0.5 mL 31 gauge x 5/16 Syrg USE TO INJECT INSULIN 2-3 TIMES A DAY    calcium acetate (PHOSLO) 667 mg capsule TAKE 2 CAPSULES BY MOUTH 3 TIMES A DAY WITH MEALS    carvedilol (COREG) 6.25 MG tablet Take 1 tablet (6.25 mg total) by mouth 2 (two) times daily.    clopidogrel (PLAVIX) 75 mg tablet Take 1 tablet (75 mg total) by mouth once daily.    dextromethorphan-guaifenesin  mg Tab Take 1 tablet by mouth every 8 (eight) hours as needed.    DICYCLOMINE HCL (GI COCKTAIL SIMPLE RECORD) GI Cocktail (Mylanta 30 mL, 2% viscous lidocaine 10 mL, dicyclomine 10 mL)    hydrALAZINE (APRESOLINE) 100 MG tablet Take 1 tablet (100 mg total) by mouth 3 (three) times daily.    insulin aspart (NOVOLOG) 100 unit/mL InPn pen Inject 0-5 Units into the skin before meals and at bedtime as needed (Hyperglycemia).    irbesartan (AVAPRO) 300 MG tablet Take 300 mg by mouth once daily.    loperamide (IMODIUM  A-D) 2 mg Tab Take 2 mg by mouth 4 (four) times daily as needed.    meclizine (ANTIVERT) 25 mg tablet Take 1 tablet (25 mg total) by mouth 3 (three) times daily as needed for Dizziness.    nitroGLYCERIN (NITROSTAT) 0.4 MG SL tablet Place 1 tablet (0.4 mg total) under the tongue every 5 (five) minutes as needed for Chest pain (maximum of 3 tablets in 15 minutes.).    omeprazole (PRILOSEC) 40 MG capsule Take 40 mg by mouth once daily.    polyvinyl alcohol, artificial tears, (LIQUIFILM TEARS) 1.4 % ophthalmic solution Place 1 drop into both eyes as needed.    propylene glycol (SYSTANE BALANCE) 0.6 % Drop Place 1 drop into both eyes 2 (two) times daily.    ranitidine (ZANTAC) 300 MG tablet Take 1 tablet (300 mg total) by mouth every evening.    SENSIPAR 30 mg Tab TAKE 1 TABLET EVERY DAY    vitamin renal formula, B-complex-vitamin c-folic acid, (NEPHROCAP) 1 mg Cap Take 1 capsule by mouth once daily.    white petrolatum-mineral oil 57.3-42.5% (REFRESH P.M.) 57.3-42.5 % Oint every evening.     Family History     Problem Relation (Age of Onset)    Diabetes Sister    Kidney disease Mother        Social History Main Topics    Smoking status: Former Smoker    Smokeless tobacco: Never Used    Alcohol use No    Drug use: No    Sexual activity: Not on file     Review of Systems   Constitutional: Positive for appetite change (decreased since going to [place] and fatigue. Negative for diaphoresis and fever.        Malaise   Eyes: Positive for pain (dry eye (r)).   Respiratory: Negative for shortness of breath and wheezing.    Cardiovascular: Negative for chest pain.   Gastrointestinal: Negative for abdominal pain, constipation, diarrhea, nausea and vomiting.   Genitourinary: Negative for dysuria.   Musculoskeletal: Negative for gait problem.   Neurological: Positive for weakness. Negative for seizures and headaches.   Psychiatric/Behavioral: Negative for sleep disturbance.   All other systems reviewed and are  "negative.     Objective:     Vital Signs (Most Recent):  Pulse: 94 (04/22/18 0000)  BP: (!) 156/69 (04/22/18 0000)  SpO2: 98 % (04/21/18 2350) Vital Signs (24h Range):  Temp:  [98 °F (36.7 °C)-98.5 °F (36.9 °C)] 98 °F (36.7 °C)  Pulse:  [] 94  Resp:  [18-26] 18  SpO2:  [98 %-100 %] 98 %  BP: (140-156)/(61-95) 156/69     Physical Exam   Constitutional: She is oriented to person, place, and time. No distress.   Appears stated age, elderly, frail   HENT:   Head: Normocephalic and atraumatic.   Mouth/Throat: No oropharyngeal exudate.   Eyes:   Right eye kept shut: "dry"   Neck: Normal range of motion. Neck supple. No JVD present.   Cardiovascular: Normal rate, regular rhythm, normal heart sounds and intact distal pulses.    No murmur heard.  Left upper arm fistula w/ good thrill   Pulmonary/Chest: Effort normal. No respiratory distress. She has no wheezes. She has rales.   Frequent cough   Abdominal: Soft. Bowel sounds are normal. She exhibits no distension and no mass. There is no tenderness.   Musculoskeletal: Normal range of motion. She exhibits no edema or deformity.   Neurological: She is alert and oriented to person, place, and time. No cranial nerve deficit.   Skin: Capillary refill takes less than 2 seconds. She is not diaphoretic.   Psychiatric: She has a normal mood and affect. Her behavior is normal.   Nursing note and vitals reviewed.       Significant Labs:   CBC:   Recent Labs  Lab 04/21/18 1813   WBC 10.51   HGB 9.8*   HCT 31.7*        CMP:   Recent Labs  Lab 04/21/18 1813   *   K 4.0   CL 91*   CO2 29   *   BUN 42*   CREATININE 4.40*   CALCIUM 8.9   PROT 7.4   ALBUMIN 4.2   BILITOT 0.7   ALKPHOS 297*   AST 39   ALT 23   ANIONGAP 15   EGFRNONAA 8.5*     Troponin:   Recent Labs  Lab 04/21/18  1813 04/21/18  2308   TROPONINI 0.547* 1.199*     Urine Studies:   Recent Labs  Lab 04/21/18  1826   COLORU Yellow   APPEARANCEUA Cloudy*   PHUR 7.0   SPECGRAV 1.025   PROTEINUA 3+* " "  GLUCUA Negative   KETONESU Negative   BILIRUBINUA Negative   OCCULTUA 2+*   NITRITE Negative   UROBILINOGEN Negative   LEUKOCYTESUR 2+*   RBCUA 3   WBCUA >100*   BACTERIA Many*   HYALINECASTS 0       Significant Imaging:  CXR: "Asymmetric interstitial opacities involving lung bases and left tenzin-hilar region. Edema or infection would have this appearance. Consider BMP"    Assessment/Plan:     Hypertensive heart failure with end stage renal disease on dialysis    BNP > 70,000 but with no leg swelling and some wheezing  Last ECHO was :    EF of 55%   mild mitral valve regurg   diastolic dysfunction   small pericardial effusion   mild tricuspid regurg  Avoiding IVF fluids for now as CXR demonstrates likely overload  Consult Nephrology- [facility]  BP stable, albeit slightly high On irbesartan 200 mg daily, hydarlazine 100 mg po TID, Coreg 6.25 BID  Lipids (on lipitor 40)   TC: 173   Tri   HDL: 36   LDLca     Elevated troponin level    Trop in ED 0.547 > 1.199 (baseline 0.03)  No chest pain   EKG with no ST changes, but + for flipped T waves in inferior leads  Cardiology consulted, will see pt in AM  Already on ASA, plavix, statin      Acute cystitis without hematuria    Urine 2+ leuks, >100 WBCs, with many bacteria, nitrite neg  Urine culture pending, with susceptibilities  Avoiding nitrofurantin and bactrim due to geriatric population and renal disease  Giving 5 days of Rocephin for additional respiratory coverage      GERD (gastroesophageal reflux disease)    Cont PPI. Stable      Upper respiratory infection    Sputum color change from white to yellow, non-smoker  In nursing home, so potentially would need to cover for HAP  Xray demonstrates bilat infiltrates vs edema  Given dose of levo x 1 in ED.  On rocephin x 5 days for UTI + possible PNA (was taking Z-armani)      ESRD (end stage renal disease) on dialysis    MWF Dialysis  Cr: 4.4, GFR: 9.8  Completed full dialysis Friday  Likely dialysis " today  Daily labs      Type II diabetes mellitus with end-stage renal disease    Was diet controlled, HgbA1C 7.0 worsening from 6.3 in September        VTE Risk Mitigation         Ordered     heparin (porcine) injection 5,000 Units  Every 8 hours      04/21/18 2257     IP VTE HIGH RISK PATIENT  Once      04/21/18 2257        Ulysses Couch MD  Department of Hospital Medicine   [facility]

## 2018-04-22 NOTE — HOSPITAL COURSE
Pt received breathing treatment and 1 dose of levofloxacin in ED for suspected HCAP (from nursing home).  XRAY not visible in this system, obtaining new.

## 2018-04-22 NOTE — CONSULTS
[facility]  Nephrology  Consult Note    Patient Name: Albina Garcia  MRN: [id number]  Admission Date: 4/21/2018  Hospital Length of Stay: 0 days  Attending Provider: William Johnson III, MD   Primary Care Physician: Judie Dsouza MD  Principal Problem:Elevated troponin level    Inpatient consult to [facility] (Jose Manuel Gleason, [place])  Consult performed by: JACQUELINE GOMES  Consult ordered by: BEN WAKEFIELD  Reason for consult: dialysis, volume overload        Subjective:     HPI: 87yo F with PMH ESRD on MWF at [facility] with Dr Pearson who presents to our facility for persistent cough and SOB for the last 3 weeks, cough productive of white sputum.  Of note, she frequently complains of cough productive of white sputum in the chronic dialysis unit in [place].  Dialysis nursing report she coughs up white sputum all throughout her treatments and this has been going on for several months, at least 6 or 7 months maybe even longer.  She frequently c/o fatigue and weakness in the chronic unit, she does not want to stay in a nursing home, wants to be at home.  She denies any fevers, chills, nausea, vomiting, diarrhea, difficulty with dialysis sessions.  She is currently on RA.    Past Medical History:   Diagnosis Date    Anemia in CKD (chronic kidney disease) 10/14/2013    Arthritis     Back pain, chronic     Blood transfusion     CHF (congestive heart failure)     Diabetes mellitus     Elevated cholesterol     ESRD on hemodialysis     esrd    HTN (hypertension) 10/14/2013    Metabolic bone disease 10/14/2013       Past Surgical History:   Procedure Laterality Date    SPINAL FUSION      TOTAL ABDOMINAL HYSTERECTOMY      TOTAL KNEE ARTHROPLASTY Left     left       Review of patient's allergies indicates:   Allergen Reactions    Penicillins      Current Facility-Administered Medications   Medication Frequency    0.9%  NaCl infusion PRN    0.9%  NaCl infusion Once     acetaminophen tablet 650 mg Q8H PRN    albuterol-ipratropium 2.5mg-0.5mg/3mL nebulizer solution 3 mL Q4H    artificial tears 0.5 % ophthalmic solution 2 drop QID PRN    aspirin chewable tablet 81 mg Daily    atorvastatin tablet 40 mg Nightly    calcium acetate capsule 1,334 mg TID WM    carvedilol tablet 6.25 mg BID    cefTRIAXone (ROCEPHIN) 1 g in dextrose 5 % 50 mL IVPB Q24H    cinacalcet tablet 30 mg Daily    clopidogrel tablet 75 mg Daily    dextromethorphan-guaifenesin  mg/5 ml liquid 5 mL Q4H PRN    dextrose 50% injection 12.5 g PRN    dextrose 50% injection 25 g PRN    diphenhydrAMINE injection 25 mg Q6H PRN    glucagon (human recombinant) injection 1 mg PRN    glucose chewable tablet 16 g PRN    glucose chewable tablet 24 g PRN    heparin (porcine) injection 5,000 Units Q8H    hydrALAZINE tablet 100 mg TID    insulin aspart U-100 pen 0-5 Units QID (AC + HS) PRN    irbesartan tablet 300 mg Daily    nitroGLYCERIN SL tablet 0.4 mg Q5 Min PRN    ondansetron injection 4 mg Q6H PRN    pantoprazole EC tablet 40 mg Daily    sodium chloride 0.9% flush 5 mL PRN    vitamin renal formula (B-complex-vitamin c-folic acid) 1 mg per capsule 1 capsule Daily     Family History     Problem Relation (Age of Onset)    Diabetes Sister    Kidney disease Mother        Social History Main Topics    Smoking status: Former Smoker    Smokeless tobacco: Never Used    Alcohol use No    Drug use: No    Sexual activity: Not on file     Review of Systems   Constitutional: Positive for appetite change. Negative for chills and fever.   HENT: Negative for hearing loss.    Eyes: Negative for visual disturbance.   Respiratory: Positive for cough and shortness of breath.    Cardiovascular: Negative for chest pain and leg swelling.   Gastrointestinal: Negative for abdominal pain, diarrhea, nausea and vomiting.   Skin: Negative for rash and wound.   Neurological: Positive for weakness. Negative for headaches.    Psychiatric/Behavioral: Negative for confusion.   All other systems reviewed and are negative.    Objective:     Vital Signs (Most Recent):  Temp: 98.4 °F (36.9 °C) (04/22/18 0755)  Pulse: 95 (04/22/18 0833)  Resp: 20 (04/22/18 0833)  BP: (!) 141/76 (04/22/18 0755)  SpO2: 95 % (04/22/18 0833)  O2 Device (Oxygen Therapy): room air (04/22/18 0833) Vital Signs (24h Range):  Temp:  [97.9 °F (36.6 °C)-98.6 °F (37 °C)] 98.4 °F (36.9 °C)  Pulse:  [] 95  Resp:  [16-26] 20  SpO2:  [95 %-100 %] 95 %  BP: (140-160)/(61-95) 141/76     Weight: 53.5 kg (117 lb 15.1 oz) (04/22/18 0630)  Body mass index is 19.63 kg/m².  Body surface area is 1.57 meters squared.    I/O last 3 completed shifts:  In: -   Out: 1 [Stool:1]    Physical Exam   Constitutional: She is oriented to person, place, and time. She appears well-developed and well-nourished. No distress.   HENT:   Head: Normocephalic and atraumatic.   Eyes: EOM are normal. No scleral icterus.   Cardiovascular: Normal rate, regular rhythm and normal heart sounds.  Exam reveals no friction rub.    No murmur heard.  IVAN AVF   Pulmonary/Chest: Effort normal. No respiratory distress. She has no wheezes. She has rales.   Abdominal: Soft. She exhibits no distension. There is no tenderness.   Musculoskeletal: She exhibits no edema or deformity.   Neurological: She is alert and oriented to person, place, and time.   Skin: Skin is warm and dry. No rash noted. She is not diaphoretic.   Psychiatric: She has a normal mood and affect. Her behavior is normal.   Nursing note and vitals reviewed.      Significant Labs:  CBC:   Recent Labs  Lab 04/22/18  0505   WBC 9.27   RBC 3.47*   HGB 10.0*   HCT 32.2*      MCV 93   MCH 28.8   MCHC 31.1*     CMP:   Recent Labs  Lab 04/22/18  0505   *   CALCIUM 9.4   ALBUMIN 3.5   PROT 7.5   *   K 4.5   CO2 25   CL 91*   BUN 46*   CREATININE 5.4*   ALKPHOS 295*   ALT 21   AST 31   BILITOT 0.7     All labs within the past 24 hours have  been reviewed.    Significant Imaging:  X-Ray: Reviewed     Echo 9/2017:    1 - Normal left ventricular systolic function (EF 55-60%).     2 - Impaired LV relaxation, normal LAP (grade 1 diastolic dysfunction).     3 - Normal right ventricular systolic function .     4 - Mild mitral regurgitation.     5 - Mild tricuspid regurgitation.     6 - Small pericardial effusion    Assessment/Plan:     Active Diagnoses:    Diagnosis Date Noted POA    PRINCIPAL PROBLEM:  Elevated troponin level [R74.8] 04/21/2018 Yes    Acute cystitis without hematuria [N30.00] 04/22/2018 Yes    GERD (gastroesophageal reflux disease) [K21.9] 09/14/2017 Yes    Upper respiratory infection [J06.9] 09/04/2017 Yes    ESRD (end stage renal disease) on dialysis [N18.6, Z99.2] 01/11/2017 Not Applicable    Type II diabetes mellitus with end-stage renal disease [E11.22, N18.6] 05/18/2015 Yes     Chronic    Hypertensive heart failure with end stage renal disease on dialysis [I13.2, N18.6, Z99.2] 10/14/2013 Not Applicable     Chronic      Problems Resolved During this Admission:    Diagnosis Date Noted Date Resolved POA       1. ESRD - usual HD on MWF at [facility] with Dr Pearson, Rx 3hrs   - UF today and continue her usual MWF schedule while she remains admitted  - renally dose medications for dialysis  - pulmonary edema/acute on chronic DHF - UF today as tolerated, goal 2L  - hyponatremia - UF as tolerated today  2. HTN - continue home medications, UF as tolerated with HD   3. Anemia in chronic kidney disease - no indication for Epo currently, Hgb acceptable, continue to monitor CBC  4. MBD/Secondary hyperparathyroidism - continue sensipar and phoslo QAC.    - monitor phos  5. Access - IVAN AVF  6. Nutrition - renal/diabetic diet, renal vitamin once she is given a diet, currently NPO      CAD, s/p angiogram 5/18/17 with stent placement  - s/p angiogram 9/15/17 with stent placement  Vertigo  Autonomic neuropathy with DM2  Hypertensive heart  failure, chronic DHF  DM2      Thank you for allowing me to participate in the care of your patient.  Please call with any questions.     Zahraa Campos MD   Nephrology  [facility]  Office [phone]  [facility] Cross covering for:  [facility]  NILO Gleason MD, RAZA NEGRON MD,   MD LOI Seymour, NP  [address]  [place], [address] [phone]

## 2018-04-22 NOTE — PLAN OF CARE
Problem: Hemodialysis (Adult)  Goal: Signs and Symptoms of Listed Potential Problems Will be Absent, Minimized or Managed (Hemodialysis)  Signs and symptoms of listed potential problems will be absent, minimized or managed by discharge/transition of care (reference Hemodialysis (Adult) CPG).   Outcome: Ongoing (interventions implemented as appropriate)   04/22/18 1208   Hemodialysis   Problems Assessed (Hemodialysis) all   Problems Present (Hemodialysis) fluid imbalance   Supplemental Uf only tx. 2h/2L as tolerates.

## 2018-04-22 NOTE — HPI
87 yo F w/ ESRD on HD, CAD (s/p PCI 5/17 and stent placement in 9/17 w/ Dr. Castano), HTN, GERD, DM (HgbA1C 6.3- on diet control), chronic diastolic HF presents from assisted through [place] [facility] for elevated troponin.  Original EMS call placed from nursing home was for SOB, but pt now reports only fatigue and overall malaise.  She and her daughter state this has been present to 2 weeks, but she has had decreased PO intake for 9 months since going to the nursing home.    Dialysis is MWF.  She was dialyzed yesterday for a full treatment with no complications.  Denies chest pain although trop is elevated beyond her baseline mild elevation.  EKG shows no ST changes.    She also reports a productive cough.  Sputum had been white and frothy, but today was yellow.  Roommate at nursing home also with cough.  Had been taking Z-armani.  Denies fevers, chills, abdom pain, N/V.   Complains of dry right eye chronically.    Pt admitted for ACS rule-out.  Also, found to have UTI on UA. Culture pending.  Will treat empirically and follow cultures, though has PCN allergy and renal disease. Currently no chest pain.      ED talked with cardiology and pt will evaluated further by them in the AM.

## 2018-04-22 NOTE — NURSING
Pt has been stable, AAOx3 and in NAD.  Pt has a very productive cough and  have been encouraging her to cough to help expel mucus.  She has very little appetite but have also been encouraging her to eat to regain her strength.

## 2018-04-22 NOTE — PROCEDURES
"Albina Garcia is a 86 y.o. female patient.    Temp: 98.4 °F (36.9 °C) (04/22/18 0755)  Pulse: 95 (04/22/18 0833)  Resp: 20 (04/22/18 0833)  BP: (!) 141/76 (04/22/18 0755)  SpO2: 95 % (04/22/18 0833)  Weight: 53.5 kg (117 lb 15.1 oz) (04/22/18 0630)  Height: 5' 5" (165.1 cm) (04/22/18 0505)       Prepare patient for dialysis  Date/Time: 4/22/2018 12:11 PM  Performed by: JACQUELINE CAMPOS.  Authorized by: JACQUELINE CAMPOS       Patient seen and examined on dialysis.  /64  HR 86  AP -154   181       Full consult note to follow with history and physical exam.    June Campos  4/22/2018  "

## 2018-04-23 LAB
ALBUMIN SERPL BCP-MCNC: 3.5 G/DL
ALP SERPL-CCNC: 270 U/L
ALT SERPL W/O P-5'-P-CCNC: 18 U/L
ANION GAP SERPL CALC-SCNC: 19 MMOL/L
AST SERPL-CCNC: 29 U/L
BASOPHILS # BLD AUTO: 0.03 K/UL
BASOPHILS NFR BLD: 0.5 %
BILIRUB SERPL-MCNC: 0.7 MG/DL
BNP SERPL-MCNC: 4775 PG/ML
BUN SERPL-MCNC: 68 MG/DL
CALCIUM SERPL-MCNC: 9.6 MG/DL
CHLORIDE SERPL-SCNC: 89 MMOL/L
CO2 SERPL-SCNC: 24 MMOL/L
CREAT SERPL-MCNC: 7.2 MG/DL
DIASTOLIC DYSFUNCTION: YES
DIFFERENTIAL METHOD: ABNORMAL
EOSINOPHIL # BLD AUTO: 0.1 K/UL
EOSINOPHIL NFR BLD: 0.9 %
ERYTHROCYTE [DISTWIDTH] IN BLOOD BY AUTOMATED COUNT: 17 %
EST. GFR  (AFRICAN AMERICAN): 5 ML/MIN/1.73 M^2
EST. GFR  (NON AFRICAN AMERICAN): 5 ML/MIN/1.73 M^2
ESTIMATED PA SYSTOLIC PRESSURE: 6.53
GLUCOSE SERPL-MCNC: 245 MG/DL
HCT VFR BLD AUTO: 29.5 %
HGB BLD-MCNC: 9.4 G/DL
LYMPHOCYTES # BLD AUTO: 0.9 K/UL
LYMPHOCYTES NFR BLD: 16.1 %
MAGNESIUM SERPL-MCNC: 2.3 MG/DL
MCH RBC QN AUTO: 29.1 PG
MCHC RBC AUTO-ENTMCNC: 31.9 G/DL
MCV RBC AUTO: 91 FL
MITRAL VALVE MOBILITY: NORMAL
MONOCYTES # BLD AUTO: 0.6 K/UL
MONOCYTES NFR BLD: 10.7 %
NEUTROPHILS # BLD AUTO: 4.1 K/UL
NEUTROPHILS NFR BLD: 71.8 %
PHOSPHATE SERPL-MCNC: 3.1 MG/DL
PLATELET # BLD AUTO: 230 K/UL
PMV BLD AUTO: 11.9 FL
POCT GLUCOSE: 162 MG/DL (ref 70–110)
POCT GLUCOSE: 174 MG/DL (ref 70–110)
POCT GLUCOSE: 227 MG/DL (ref 70–110)
POCT GLUCOSE: 297 MG/DL (ref 70–110)
POTASSIUM SERPL-SCNC: 4.6 MMOL/L
PROT SERPL-MCNC: 7.9 G/DL
RBC # BLD AUTO: 3.23 M/UL
RETIRED EF AND QEF - SEE NOTES: 35 (ref 55–65)
SODIUM SERPL-SCNC: 132 MMOL/L
TRICUSPID VALVE REGURGITATION: ABNORMAL
TROPONIN I SERPL DL<=0.01 NG/ML-MCNC: 4.06 NG/ML
WBC # BLD AUTO: 5.7 K/UL

## 2018-04-23 PROCEDURE — 36415 COLL VENOUS BLD VENIPUNCTURE: CPT

## 2018-04-23 PROCEDURE — 83880 ASSAY OF NATRIURETIC PEPTIDE: CPT

## 2018-04-23 PROCEDURE — 84100 ASSAY OF PHOSPHORUS: CPT

## 2018-04-23 PROCEDURE — 99900035 HC TECH TIME PER 15 MIN (STAT)

## 2018-04-23 PROCEDURE — 83735 ASSAY OF MAGNESIUM: CPT

## 2018-04-23 PROCEDURE — 85025 COMPLETE CBC W/AUTO DIFF WBC: CPT

## 2018-04-23 PROCEDURE — 80100016 HC MAINTENANCE HEMODIALYSIS

## 2018-04-23 PROCEDURE — 94664 DEMO&/EVAL PT USE INHALER: CPT

## 2018-04-23 PROCEDURE — 25000003 PHARM REV CODE 250: Performed by: FAMILY MEDICINE

## 2018-04-23 PROCEDURE — 25000003 PHARM REV CODE 250: Performed by: STUDENT IN AN ORGANIZED HEALTH CARE EDUCATION/TRAINING PROGRAM

## 2018-04-23 PROCEDURE — 80053 COMPREHEN METABOLIC PANEL: CPT

## 2018-04-23 PROCEDURE — 94640 AIRWAY INHALATION TREATMENT: CPT

## 2018-04-23 PROCEDURE — 93306 TTE W/DOPPLER COMPLETE: CPT

## 2018-04-23 PROCEDURE — 97802 MEDICAL NUTRITION INDIV IN: CPT

## 2018-04-23 PROCEDURE — 63600175 PHARM REV CODE 636 W HCPCS: Performed by: STUDENT IN AN ORGANIZED HEALTH CARE EDUCATION/TRAINING PROGRAM

## 2018-04-23 PROCEDURE — 84484 ASSAY OF TROPONIN QUANT: CPT

## 2018-04-23 PROCEDURE — 25000242 PHARM REV CODE 250 ALT 637 W/ HCPCS: Performed by: FAMILY MEDICINE

## 2018-04-23 PROCEDURE — 99233 SBSQ HOSP IP/OBS HIGH 50: CPT | Mod: ,,, | Performed by: NURSE PRACTITIONER

## 2018-04-23 PROCEDURE — 93306 TTE W/DOPPLER COMPLETE: CPT | Mod: 26,,, | Performed by: INTERNAL MEDICINE

## 2018-04-23 PROCEDURE — 12000002 HC ACUTE/MED SURGE SEMI-PRIVATE ROOM

## 2018-04-23 PROCEDURE — 99223 1ST HOSP IP/OBS HIGH 75: CPT | Mod: ,,, | Performed by: FAMILY MEDICINE

## 2018-04-23 PROCEDURE — 94761 N-INVAS EAR/PLS OXIMETRY MLT: CPT

## 2018-04-23 RX ORDER — FUROSEMIDE 40 MG/1
40 TABLET ORAL DAILY
Status: DISCONTINUED | OUTPATIENT
Start: 2018-04-23 | End: 2018-04-25

## 2018-04-23 RX ORDER — CALCIUM ACETATE 667 MG/1
667 CAPSULE ORAL
Status: DISCONTINUED | OUTPATIENT
Start: 2018-04-24 | End: 2018-04-25

## 2018-04-23 RX ADMIN — HYPROMELLOSE 2910 2 DROP: 5 SOLUTION OPHTHALMIC at 08:04

## 2018-04-23 RX ADMIN — IRBESARTAN 300 MG: 150 TABLET ORAL at 04:04

## 2018-04-23 RX ADMIN — FUROSEMIDE 40 MG: 40 TABLET ORAL at 04:04

## 2018-04-23 RX ADMIN — IPRATROPIUM BROMIDE AND ALBUTEROL SULFATE 3 ML: .5; 3 SOLUTION RESPIRATORY (INHALATION) at 04:04

## 2018-04-23 RX ADMIN — CINACALCET HYDROCHLORIDE 30 MG: 30 TABLET, COATED ORAL at 10:04

## 2018-04-23 RX ADMIN — IPRATROPIUM BROMIDE AND ALBUTEROL SULFATE 3 ML: .5; 3 SOLUTION RESPIRATORY (INHALATION) at 11:04

## 2018-04-23 RX ADMIN — HYDRALAZINE HYDROCHLORIDE 100 MG: 25 TABLET, FILM COATED ORAL at 04:04

## 2018-04-23 RX ADMIN — ACETAMINOPHEN 650 MG: 325 TABLET ORAL at 10:04

## 2018-04-23 RX ADMIN — PANTOPRAZOLE SODIUM 40 MG: 40 TABLET, DELAYED RELEASE ORAL at 10:04

## 2018-04-23 RX ADMIN — LIDOCAINE 2 PATCH: 50 PATCH TOPICAL at 11:04

## 2018-04-23 RX ADMIN — IPRATROPIUM BROMIDE AND ALBUTEROL SULFATE 3 ML: .5; 3 SOLUTION RESPIRATORY (INHALATION) at 08:04

## 2018-04-23 RX ADMIN — CARVEDILOL 6.25 MG: 6.25 TABLET, FILM COATED ORAL at 08:04

## 2018-04-23 RX ADMIN — HEPARIN SODIUM 5000 UNITS: 5000 INJECTION, SOLUTION INTRAVENOUS; SUBCUTANEOUS at 10:04

## 2018-04-23 RX ADMIN — CEFTRIAXONE SODIUM 1 G: 1 INJECTION, POWDER, FOR SOLUTION INTRAMUSCULAR; INTRAVENOUS at 10:04

## 2018-04-23 RX ADMIN — ACETAMINOPHEN 650 MG: 325 TABLET ORAL at 06:04

## 2018-04-23 RX ADMIN — ATORVASTATIN CALCIUM 40 MG: 40 TABLET, FILM COATED ORAL at 08:04

## 2018-04-23 RX ADMIN — HEPARIN SODIUM 5000 UNITS: 5000 INJECTION, SOLUTION INTRAVENOUS; SUBCUTANEOUS at 04:04

## 2018-04-23 RX ADMIN — CALCIUM ACETATE 1334 MG: 667 CAPSULE ORAL at 06:04

## 2018-04-23 RX ADMIN — ASPIRIN 81 MG 81 MG: 81 TABLET ORAL at 10:04

## 2018-04-23 RX ADMIN — INSULIN ASPART 1 UNITS: 100 INJECTION, SOLUTION INTRAVENOUS; SUBCUTANEOUS at 08:04

## 2018-04-23 NOTE — PROGRESS NOTES
[facility]  Cardiology  Progress Note    Patient Name: Albina Garcia  MRN: [id number]  Admission Date: 4/21/2018  Hospital Length of Stay: 1 days  Code Status: Full Code   Attending Physician: William Johnson III, MD   Primary Care Physician: Judie Dsouza MD  Expected Discharge Date:   Principal Problem:NSTEMI (non-ST elevated myocardial infarction)    Subjective:     Hospital Course:   4/21/2018-4/22/2018 Abx for UTI + cough HD for volume control Medical therapy for CAD  4/23/2018 NT pro BNP 13300 with repeat BNP this AM 4775. Troponin .547-1.199-1.942-4.059. Echocardiogram ordered for today. HD in progress with 2.5 liters to be removed today and 2.4 liters removed yesterday. Plan for conservative medical management for now with no plans for invasive procedure            Review of Systems   Constitution: Negative for chills, decreased appetite, diaphoresis, fever and weakness.   Cardiovascular: Negative for chest pain, claudication, cyanosis, dyspnea on exertion, irregular heartbeat, leg swelling, near-syncope, orthopnea, palpitations, paroxysmal nocturnal dyspnea and syncope.   Respiratory: Negative for cough, hemoptysis, shortness of breath and wheezing.    Gastrointestinal: Negative for bloating, abdominal pain, constipation, diarrhea, melena, nausea and vomiting.   Neurological: Negative for dizziness.   All other systems reviewed and are negative.    Objective:     Vital Signs (Most Recent):  Temp: 97.5 °F (36.4 °C) (04/23/18 0833)  Pulse: 78 (04/23/18 1200)  Resp: 18 (04/23/18 1151)  BP: (!) 100/53 (04/23/18 1115)  SpO2: (!) 94 % (04/23/18 1151) Vital Signs (24h Range):  Temp:  [97.5 °F (36.4 °C)-99.6 °F (37.6 °C)] 97.5 °F (36.4 °C)  Pulse:  [77-90] 78  Resp:  [16-20] 18  SpO2:  [94 %-100 %] 94 %  BP: ()/(50-72) 100/53     Weight: 53.5 kg (117 lb 15.1 oz)  Body mass index is 19.63 kg/m².     SpO2: (!) 94 %  O2 Device (Oxygen Therapy): room air      Intake/Output Summary (Last 24 hours) at  04/23/18 1217  Last data filed at 04/22/18 1838   Gross per 24 hour   Intake              580 ml   Output             2400 ml   Net            -1820 ml       Lines/Drains/Airways     Central Venous Catheter Line                 Hemodialysis Catheter Arteriovenous fistula Left Arm -- days          Drain                 Hemodialysis AV Fistula Left upper arm -- days          Peripheral Intravenous Line                 Peripheral IV - Single Lumen 04/21/18 1814 Right Ankle 1 day                Physical Exam   Constitutional: She is oriented to person, place, and time. She appears well-developed and well-nourished. No distress.   Cardiovascular: Normal rate and regular rhythm.  Exam reveals no gallop.    No murmur heard.  Pulmonary/Chest: Effort normal. No respiratory distress. She has decreased breath sounds. She has no wheezes.   Abdominal: Soft. Bowel sounds are normal. She exhibits no distension. There is no tenderness.   Neurological: She is alert and oriented to person, place, and time.   Skin: Skin is warm and dry.       Significant Labs:       Recent Labs  Lab 04/23/18  0810   TROPONINI 4.059*       Recent Labs  Lab 04/23/18  0810   *   K 4.6   CL 89*   CO2 24   BUN 68*   CREATININE 7.2*   MG 2.3       Recent Labs  Lab 04/23/18  0810   WBC 5.70   RBC 3.23*   HGB 9.4*   HCT 29.5*      MCV 91   MCH 29.1   MCHC 31.9*       Significant Imaging: Echocardiogram:   2D echo with color flow doppler: Repeat echo ordered with pending results   Results for orders placed or performed during the hospital encounter of 09/13/17   2D echo with color flow doppler   Result Value Ref Range    EF 55 55 - 65    Mitral Valve Regurgitation MILD     Diastolic Dysfunction Yes (A)     Pericardial Effusion SMALL (A)     Tricuspid Valve Regurgitation MILD      Assessment and Plan:     Brief HPI: Seen this morning on AM NP rounds while receiving HD. Denied any complaints of chest pain or SOB. Complained of hip pain. Appeared to  have difficulty recalling specifics about presentation as well as history. Reviewed POC as detailed below with plans for continued conservative medical management rather than invasive therapy-appeared to only mildly understand. No family at the bedside; primary team updated. Will update family when available at bedside     * NSTEMI (non-ST elevated myocardial infarction)    -initial troponin elevated at .547; trended up to 1.199-1.942 and 4.059 this AM   -EKG with no acute changes  -repeat echo for re evaluation of LVEF and for assessment of WMA  -plans for conservative medical management with ASA, Plavix, BB, ARB and statin; conservative management rather than invasive procedure given fraility and limited physical mobility                      Elevated troponin level    -as detailed previously with NSTEMI               Coronary artery disease involving native coronary artery    -previous PTCA/stent to D1 & D2 with PTCA RCA  -previous echo with normal LVEF; repeat echo pending  -continue medical management with ASA, statin, BB, ARB and Plavix           Chronic diastolic congestive heart failure    -presented with elevated NT pro BNP and BNP  -CXR with pulmonary vascular congestion and pleural effusion L>R  -HD for volume removal with 2.4 liters removed yesterday and 2.5 liters to be removed today  -SBP with decent afterload reduction  -last echo with normal LVEF; repeat echo pending today  -continue BB, ARB and Hydralazine         Hypertensive heart failure with end stage renal disease on dialysis    -SBP with decent afterload reduction  -continue BB, ARB and vasodilator               VTE Risk Mitigation         Ordered     heparin (porcine) injection 5,000 Units  Every 8 hours      04/21/18 2257     IP VTE HIGH RISK PATIENT  Once      04/21/18 2257          TORY Bishop, ANP  Cardiology  [facility]

## 2018-04-23 NOTE — PLAN OF CARE
Problem: Patient Care Overview  Goal: Plan of Care Review  Plan of care reviewed with patient, understanding verbalized. Pt remains SR on tele. Bed alarm on, call light in reach, fall precautions in place. Will continue to monitor.

## 2018-04-23 NOTE — CONSULTS
"Consult Note  Palliative Care      Consult Requested By: William Johnson III, MD  Reason for Consult: Goals of Care/Advance Care Planning    Thanks for the consult and the opportunity to participate in Ms. Garcia's care     SUBJECTIVE:     History of Present Illness:  Disease Process: Advanced Cardiac Disease     87 y/o female admitted to [facility] on 4/21/2018. PMHx HTN, GERD, DM2, CAD (stent placement 2017), ESRD on HD-Dialysis is MWF, and Chronic diastolic Heart failure. Pt. Presented to [facility] ED from nursing home with elevated troponin levels. Patient was SOB and overall felt tired and malaise. It was reported that patient had been feeling like this for two weeks, her PO intake decreased in last nine months since in nursing home. Patient was admitted for ACS r/o. UTI found with urinalysis and was treated.     Cardiology consulted-Favor conservative approach vs invasive given fragility and limited mobility.    Palliative care establish care with patient and family. Found Ms. Garcia lying in bed somewhat lethargic. States, "I am so tired. I have not eaten much." Her spouse (Mr. Edwin Garcia) and grand daughter at bedside. Spouse stated she weaker than ever before. Palliative care educated patient and family on services and why consulted. Voiced understanding.    Palliative care assesed understanding of current disease process and illness. Verbalized that patient heart is weak and probably will need surgery. Palliative care discussed what was patient goals of care. Patient stated, " I have a living will but I don't remember what is in it." Palliative care asked family to bring copy to place in medical records. Spouse stated, " Get treatment and bring back to [facility]."     Patient and family option is to stay a full code and continue current medical treatment. Discharge option back to nursing home when treatment is done. All questions and concerns addressed. Palliative care gave card with " contact numbers should there be any more questions or concerns.     Past Medical History:   Diagnosis Date    Anemia in CKD (chronic kidney disease) 10/14/2013    Arthritis     Back pain, chronic     Blood transfusion     CHF (congestive heart failure)     Diabetes mellitus     Elevated cholesterol     ESRD on hemodialysis     esrd    HTN (hypertension) 10/14/2013    Metabolic bone disease 10/14/2013     Past Surgical History:   Procedure Laterality Date    SPINAL FUSION      TOTAL ABDOMINAL HYSTERECTOMY      TOTAL KNEE ARTHROPLASTY Left     left     Family History   Problem Relation Age of Onset    Kidney disease Mother     Diabetes Sister      Social History   Substance Use Topics    Smoking status: Former Smoker    Smokeless tobacco: Never Used    Alcohol use No       Mental Status: Oriented x3    ECOG Performance Status Grade: 3 - Confined to bed or chair 50% of waking hours    Review of Systems:  Review of systems not obtained due to patient factors Patient states she is tired and need to rest..    OBJECTIVE:     Pain Assessment: No pain reported at this time    Decision-Making Capacity: Patient answered questions, Family answered questions    Advanced Directives:  Living Will: Yes. Copy on chart: No  Do Not Resuscitate Status: No  Medical Power of : Yes. Agent's Name: Mr. Edwin Garcia (Spouse) [phone]  Registered Organ Donor: No    Living Arrangements: Lives in nursing home        ASSESSMENT/PLAN:     Recommendation  Continue Current medical treatment   Code status: Full Code  Family discharge option- To return to [facility]     Palliative Care Team will continue to follow patient to assist family with goals of care.         Thank you for the consult the opportunity to participate in Ms. Garcia's  care.       Time Spent:> 50% of 90 min. in visit spent in chart review, phone discussion of goals of care with family, symptom assessment, coordination of care and emotional  support           Adrienne Jerry, MSN, APRN, NP-C  Palliative  Medicine  [phone]

## 2018-04-23 NOTE — HOSPITAL COURSE
4/21/2018-4/22/2018 Abx for UTI + cough HD for volume control Medical therapy for CAD  4/23/2018 NT pro BNP 55466 with repeat BNP this AM 4775. Troponin .547-1.199-1.942-4.059. Echocardiogram ordered for today. HD in progress with 2.5 liters to be removed today and 2.4 liters removed yesterday. Plan for conservative medical management for now with no plans for invasive procedure

## 2018-04-23 NOTE — PT/OT/SLP PROGRESS
Occupational Therapy  Missed Eval    Patient Name:  Albina Garcia   MRN:  [id number]    Patient not seen today secondary to [facility]AN[facility] for HD. Will follow-up as able.    SHARMIN Danielle  4/23/2018

## 2018-04-23 NOTE — PLAN OF CARE
TN went to meet with patient, still off floor at this time. TN called and spoke with spouse Edwin. He informed TN patient has been a resident of [facility] for over a year. She gets around mainly via wheelchair. She goes to dialysis T/TH/SAT. TN will continue to follow.    TN faxed updated clinicals to [facility] via [facility].    Future Appointments  Date Time Provider Department Center   6/14/2018 8:40 AM WENDY [facility] OSMANY Matthew        04/23/18 1200   Discharge Assessment   Assessment Type Discharge Planning Assessment   Confirmed/corrected address and phone number on facesheet? Yes   Assessment information obtained from? Patient   Prior to hospitilization cognitive status: Unable to Assess   Prior to hospitalization functional status: Needs Assistance;Wheelchair Bound   Current cognitive status: Unable to Assess   Current Functional Status: Needs Assistance;Wheelchair Bound   Facility Arrived From: [facility]   Lives With facility resident   Able to Return to Prior Arrangements yes   Patient's perception of discharge disposition nursing home   Readmission Within The Last 30 Days no previous admission in last 30 days   Equipment Currently Used at Home wheelchair   Does the patient have transportation home? Yes   Transportation Available agency transportation;van, wheelchair accessible   Dialysis Name and Scheduled days T/TH/SAT   Does the patient receive services at the [facility]? No   Discharge Plan A Return to nursing home   Discharge Plan B Skilled Nursing Facility   Patient/Family In Agreement With Plan yes     Cindy Mast RN  Transition Navigator  [phone]

## 2018-04-23 NOTE — SUBJECTIVE & OBJECTIVE
Past Medical History:   Diagnosis Date    Anemia in CKD (chronic kidney disease) 10/14/2013    Arthritis     Back pain, chronic     Blood transfusion     CHF (congestive heart failure)     Diabetes mellitus     Elevated cholesterol     ESRD on hemodialysis     esrd    HTN (hypertension) 10/14/2013    Metabolic bone disease 10/14/2013       Past Surgical History:   Procedure Laterality Date    SPINAL FUSION      TOTAL ABDOMINAL HYSTERECTOMY      TOTAL KNEE ARTHROPLASTY Left     left       Review of patient's allergies indicates:   Allergen Reactions    Penicillins        No current facility-administered medications on file prior to encounter.      Current Outpatient Prescriptions on File Prior to Encounter   Medication Sig    acetaminophen (TYLENOL) 325 MG tablet Take 2 tablets (650 mg total) by mouth every 8 (eight) hours as needed for Pain or Temperature greater than (100.4 F).    aspirin 81 MG Chew Take 1 tablet (81 mg total) by mouth once daily.    atorvastatin (LIPITOR) 40 MG tablet Take 1 tablet (40 mg total) by mouth once daily. (Patient taking differently: Take 40 mg by mouth nightly. )    BD INSULIN SYRINGE ULTRA-FINE 0.5 mL 31 gauge x 5/16 Syrg USE TO INJECT INSULIN 2-3 TIMES A DAY    calcium acetate (PHOSLO) 667 mg capsule TAKE 2 CAPSULES BY MOUTH 3 TIMES A DAY WITH MEALS    carvedilol (COREG) 6.25 MG tablet Take 1 tablet (6.25 mg total) by mouth 2 (two) times daily.    clopidogrel (PLAVIX) 75 mg tablet Take 1 tablet (75 mg total) by mouth once daily.    dextromethorphan-guaifenesin  mg Tab Take 1 tablet by mouth every 8 (eight) hours as needed.    DICYCLOMINE HCL (GI COCKTAIL SIMPLE RECORD) GI Cocktail (Mylanta 30 mL, 2% viscous lidocaine 10 mL, dicyclomine 10 mL)    hydrALAZINE (APRESOLINE) 100 MG tablet Take 1 tablet (100 mg total) by mouth 3 (three) times daily.    insulin aspart (NOVOLOG) 100 unit/mL InPn pen Inject 0-5 Units into the skin before meals and at bedtime as  needed (Hyperglycemia).    irbesartan (AVAPRO) 300 MG tablet Take 300 mg by mouth once daily.    loperamide (IMODIUM A-D) 2 mg Tab Take 2 mg by mouth 4 (four) times daily as needed.    meclizine (ANTIVERT) 25 mg tablet Take 1 tablet (25 mg total) by mouth 3 (three) times daily as needed for Dizziness.    nitroGLYCERIN (NITROSTAT) 0.4 MG SL tablet Place 1 tablet (0.4 mg total) under the tongue every 5 (five) minutes as needed for Chest pain (maximum of 3 tablets in 15 minutes.).    omeprazole (PRILOSEC) 40 MG capsule Take 40 mg by mouth once daily.    polyvinyl alcohol, artificial tears, (LIQUIFILM TEARS) 1.4 % ophthalmic solution Place 1 drop into both eyes as needed.    propylene glycol (SYSTANE BALANCE) 0.6 % Drop Place 1 drop into both eyes 2 (two) times daily.    ranitidine (ZANTAC) 300 MG tablet Take 1 tablet (300 mg total) by mouth every evening.    SENSIPAR 30 mg Tab TAKE 1 TABLET EVERY DAY    vitamin renal formula, B-complex-vitamin c-folic acid, (NEPHROCAP) 1 mg Cap Take 1 capsule by mouth once daily.    white petrolatum-mineral oil 57.3-42.5% (REFRESH P.M.) 57.3-42.5 % Oint every evening.     Family History     Problem Relation (Age of Onset)    Diabetes Sister    Kidney disease Mother        Social History Main Topics    Smoking status: Former Smoker    Smokeless tobacco: Never Used    Alcohol use No    Drug use: No    Sexual activity: Not on file     Review of Systems   Constitution: Positive for decreased appetite, weakness, malaise/fatigue and weight loss. Negative for diaphoresis, night sweats and weight gain.   HENT: Negative for congestion.    Eyes: Negative for blurred vision, discharge and double vision.   Cardiovascular: Negative for chest pain, claudication, cyanosis, dyspnea on exertion, irregular heartbeat, leg swelling, near-syncope, orthopnea, palpitations, paroxysmal nocturnal dyspnea and syncope.   Respiratory: Positive for cough and sputum production. Negative for  shortness of breath and wheezing.    Endocrine: Negative for cold intolerance, heat intolerance and polyphagia.   Hematologic/Lymphatic: Negative for adenopathy and bleeding problem. Does not bruise/bleed easily.   Skin: Negative for dry skin and nail changes.   Musculoskeletal: Positive for muscle weakness. Negative for arthritis, back pain, falls, joint pain, myalgias and neck pain.   Gastrointestinal: Negative for bloating, abdominal pain, change in bowel habit and constipation.   Genitourinary: Negative for bladder incontinence, dysuria, flank pain, genital sores and missed menses.   Neurological: Negative for aphonia, brief paralysis, difficulty with concentration and dizziness.   Psychiatric/Behavioral: Negative for altered mental status and memory loss. The patient does not have insomnia.    Allergic/Immunologic: Negative for environmental allergies.     Objective:     Vital Signs (Most Recent):  Temp: 98.8 °F (37.1 °C) (04/22/18 2101)  Pulse: 85 (04/22/18 2101)  Resp: 18 (04/22/18 2101)  BP: (!) 106/53 (04/22/18 2101)  SpO2: 100 % (04/22/18 2017) Vital Signs (24h Range):  Temp:  [97.9 °F (36.6 °C)-99.6 °F (37.6 °C)] 98.8 °F (37.1 °C)  Pulse:  [77-96] 85  Resp:  [16-20] 18  SpO2:  [95 %-100 %] 100 %  BP: (106-160)/(53-76) 106/53     Weight: 53.5 kg (117 lb 15.1 oz)  Body mass index is 19.63 kg/m².    SpO2: 100 %  O2 Device (Oxygen Therapy): room air      Intake/Output Summary (Last 24 hours) at 04/22/18 2325  Last data filed at 04/22/18 1838   Gross per 24 hour   Intake              580 ml   Output             2401 ml   Net            -1821 ml       Lines/Drains/Airways     Central Venous Catheter Line                 Hemodialysis Catheter Arteriovenous fistula Left Arm -- days          Drain                 Hemodialysis AV Fistula Left upper arm -- days          Peripheral Intravenous Line                 Peripheral IV - Single Lumen 04/21/18 1814 Right Ankle 1 day                Physical Exam    Constitutional: She is oriented to person, place, and time. Vital signs are normal. She appears well-developed and well-nourished. She is not intubated.   Elderly female appearing tired    HENT:   Head: Normocephalic and atraumatic.   Right Ear: External ear normal.   Left Ear: External ear normal.   Mouth/Throat: Oropharynx is clear and moist.   Eyes: Conjunctivae and EOM are normal. Pupils are equal, round, and reactive to light. Right eye exhibits no discharge. Left eye exhibits no discharge. No scleral icterus.   Neck: Normal range of motion. Neck supple. Normal carotid pulses, no hepatojugular reflux and no JVD present. Carotid bruit is not present. No tracheal deviation present. No thyromegaly present.   Cardiovascular: Normal rate, regular rhythm, S1 normal and S2 normal.   No extrasystoles are present. PMI is not displaced.  Exam reveals no gallop, no S3, no distant heart sounds, no friction rub and no midsystolic click.    Murmur heard.   Systolic murmur is present with a grade of 2/6  at the upper right sternal border  Pulses:       Carotid pulses are 2+ on the right side, and 2+ on the left side.       Radial pulses are 2+ on the right side, and 2+ on the left side.        Femoral pulses are 2+ on the right side, and 2+ on the left side.       Popliteal pulses are 2+ on the right side, and 2+ on the left side.        Dorsalis pedis pulses are 1+ on the right side, and 1+ on the left side.        Posterior tibial pulses are 1+ on the right side, and 1+ on the left side.     LUE AVF with a thrill    Pulmonary/Chest: Effort normal and breath sounds normal. No accessory muscle usage or stridor. No apnea, no tachypnea and no bradypnea. She is not intubated. No respiratory distress. She has no decreased breath sounds. She has no wheezes. She has no rales. She exhibits no tenderness and no bony tenderness.   Abdominal: She exhibits no distension, no pulsatile liver, no abdominal bruit, no ascites, no pulsatile  midline mass and no mass. There is no tenderness. There is no rebound and no guarding.   Musculoskeletal: Normal range of motion. She exhibits no edema or tenderness.   Lymphadenopathy:     She has no cervical adenopathy.   Neurological: She is alert and oriented to person, place, and time. She has normal reflexes. No cranial nerve deficit. Coordination normal.   Skin: Skin is warm. No rash noted. No erythema. No pallor.   Psychiatric: She has a normal mood and affect. Her behavior is normal. Judgment and thought content normal.       Significant Labs:     LABS  CBC    Recent Labs  Lab 04/21/18 1813 04/22/18  0505   WBC 10.51 9.27   RBC 3.37* 3.47*   HGB 9.8* 10.0*   HCT 31.7* 32.2*    207   MCV 94 93   MCH 29.1 28.8   MCHC 30.9* 31.1*     BMP    Recent Labs  Lab 04/21/18 1813 04/22/18  0505   * 132*   K 4.0 4.5   CO2 29 25   CL 91* 91*   BUN 42* 46*   CREATININE 4.40* 5.4*   * 332*       POCT-Glucose  POCT Glucose   Date Value Ref Range Status   04/22/2018 315 (H) 70 - 110 mg/dL Final   04/22/2018 344 (H) 70 - 110 mg/dL Final   04/22/2018 311 (H) 70 - 110 mg/dL Final         Recent Labs  Lab 04/21/18  1813 04/22/18  0505   CALCIUM 8.9 9.4   MG  --  2.0   PHOS  --  2.9     LFT    Recent Labs  Lab 04/21/18 1813 04/22/18  0505   PROT 7.4 7.5   ALBUMIN 4.2 3.5   BILITOT 0.7 0.7   AST 39 31   ALKPHOS 297* 295*   ALT 23 21     GFR    COAGS  No results for input(s): PT, INR, APTT in the last 168 hours.  CE    Recent Labs  Lab 04/21/18 1813 04/21/18  2308 04/22/18  0505   TROPONINI 0.547* 1.199* 1.942*         LAST HbA1c  Lab Results   Component Value Date    HGBA1C 7.0 (H) 04/21/2018       Lipid panel  Lab Results   Component Value Date    CHOL 102 (L) 04/22/2018    CHOL 173 05/18/2017    CHOL 120 10/14/2013     Lab Results   Component Value Date    HDL 45 04/22/2018    HDL 36 (L) 05/18/2017    HDL 47 10/14/2013     Lab Results   Component Value Date    LDLCALC 44.0 (L) 04/22/2018    LDLCALC 115.4  05/18/2017    LDLCALC 62.4 (L) 10/14/2013     Lab Results   Component Value Date    TRIG 65 04/22/2018    TRIG 108 05/18/2017    TRIG 53 10/14/2013     Lab Results   Component Value Date    CHOLHDL 44.1 04/22/2018    CHOLHDL 20.8 05/18/2017    CHOLHDL 39.2 10/14/2013          Significant Imaging:       Imaging Results    None

## 2018-04-23 NOTE — CONSULTS
[facility]  Cardiology  Consult Note    Patient Name: Albina Garcia  MRN: [id number]  Admission Date: 4/21/2018  Hospital Length of Stay: 1 days  Code Status: Full Code   Attending Provider: William Johnson III, MD   Consulting Provider: TORY Bishop ANP  Primary Care Physician: Judie Dsouza MD  Principal Problem:NSTEMI (non-ST elevated myocardial infarction)         Inpatient consult to [facility]  Consult performed by: ELIZABETH MESA  Consult ordered by: BEN WAKEFIELD        See full consult note dated 4/22/2018 by Dr. David Montgomery

## 2018-04-23 NOTE — HPI
86 year old AAF [facility] via her nursing home with complaints of worsening fatigue. She has a PMH of CAD s/p PCI (D2 with 2.25 x 14 mm Resolute MATTHEW, POBA of RCA with 2.0 mm 9/2017 + D1 PCI 2.25 x 14 mm Resolute MATTHEW 5/2017), HTN, HLP, ESRD on HD, diastolic dysfunction, DM II, and nursing home resident. She has a UTI in addition to abnormal TNI. She has a cough that is new but no fevers.       Trop 1.199  NT pro-BNP >75254  HgA1c 7.0  ECG ST depression in inferior lateral leads-new  CXR-increased vascular markings    H/H 9.8/31.7

## 2018-04-23 NOTE — PROGRESS NOTES
Attempted to administer aerosol tx's @ 0000 & 0400. Pt refused both attempts, pt complaining of nausea and fatigue. BS are course diminished, no respiratory distress observed. Spo2 98% RA , will cont to monitor. RN notified of pt status.

## 2018-04-23 NOTE — PLAN OF CARE
Problem: Hemodialysis (Adult)  Goal: Signs and Symptoms of Listed Potential Problems Will be Absent, Minimized or Managed (Hemodialysis)  Signs and symptoms of listed potential problems will be absent, minimized or managed by discharge/transition of care (reference Hemodialysis (Adult) CPG).   Outcome: Ongoing (interventions implemented as appropriate)   04/23/18 0853   Hemodialysis   Problems Assessed (Hemodialysis) all   Problems Present (Hemodialysis) electrolyte imbalance;fluid imbalance   HD with UF

## 2018-04-23 NOTE — PLAN OF CARE
Problem: Patient Care Overview  Goal: Plan of Care Review  Outcome: Ongoing (interventions implemented as appropriate)  Recommendation/Intervention:   1. Encourage good intake at meals.   2. Monitor need for supplements.    Goals:   Pt will consume at least 50% intake at meals  Nutrition Goal Status: new  Communication of RD Recs: reviewed with RN

## 2018-04-23 NOTE — ASSESSMENT & PLAN NOTE
-presented with elevated NT pro BNP and BNP  -CXR with pulmonary vascular congestion and pleural effusion L>R  -HD for volume removal with 2.4 liters removed yesterday and 2.5 liters to be removed today  -SBP with decent afterload reduction  -last echo with normal LVEF; repeat echo pending today  -continue BB, ARB and Hydralazine

## 2018-04-23 NOTE — ASSESSMENT & PLAN NOTE
-initial troponin elevated at .547; trended up to 1.199-1.942 and 4.059 this AM   -EKG with no acute changes  -repeat echo for re evaluation of LVEF and for assessment of WMA  -plans for conservative medical management with ASA, Plavix, BB, ARB and statin; conservative management rather than invasive procedure given fraility and limited physical mobility

## 2018-04-23 NOTE — PROGRESS NOTES
" [facility]  Adult Nutrition  Progress Note    SUMMARY       Recommendations    Recommendation/Intervention:   1. Encourage good intake at meals.   2. Monitor need for supplements.    Goals:   Pt will consume at least 50% intake at meals  Nutrition Goal Status: new  Communication of RD Recs: reviewed with RN    Reason for Assessment  Reason for Assessment: identified at risk by screening criteria (MST)  Diagnosis:  (NSTEMI)  Relevant Medical History: DM, CJF, elevated cholesterol, arthritis, HTN, ESRD on HD, hysterectomy  General Information Comments: Pt now on Cardiac Renal diet. Was NPO this morning. Pt off unit at visit. Receivies HD.    Nutrition Risk Screen  Nutrition Risk Screen: no indicators present    Nutrition/Diet History  Food Preferences: unable to assess  Do you have any cultural, spiritual, Temple conflicts, given your current situation?: no    Anthropometrics  Temp: 96.3 °F (35.7 °C)  Height: 5' 5" (165.1 cm)  Height (inches): 65 in  Weight Method: Bed Scale  Weight: 53.5 kg (117 lb 15.1 oz)  Weight (lb): 117.95 lb  Ideal Body Weight (IBW), Female: 125 lb  % Ideal Body Weight, Female (lb): 94.36 lb  BMI (Calculated): 19.7  BMI Grade: 18.5-24.9 - normal     Lab/Procedures/Meds  Pertinent Labs Reviewed: reviewed  Pertinent Labs Comments: NA 132L, BUN 68H, Crea 7.2H, Glu 245H  Pertinent Medications Reviewed: reviewed  Pertinent Medications Comments: aspirin, rocephin, Lasix, renal vitamin    Physical Findings/Assessment  Overall Physical Appearance: loss of muscle mass  Tubes:  (none)  Oral/Mouth Cavity:  (unable to assess)  Skin:  (Reginald 20-intact)    Estimated/Assessed Needs  Weight Used For Calorie Calculations: 53.5 kg (117 lb 15.1 oz)  Energy Calorie Requirements (kcal): 2636-4272  Energy Need Method: Kcal/kg (30-35 kcal/kg)  Protein Requirements: 64g (1.2g/kg)  Weight Used For Protein Calculations: 53.5 kg (117 lb 15.1 oz)  Fluid Need Method:  (Per HD)  RDA Method (mL): " 1605     Nutrition Prescription Ordered  Current Diet Order: Renal Cardiac    Evaluation of Received Nutrient/Fluid Intake  % Intake of Estimated Energy Needs: Other: unsure of po intake  % Meal Intake: Other: unsure of po intake    Nutrition Risk  Level of Risk/Frequency of Follow-up:  (2xweekly)     Assessment and Plan  No nutrition dx at this time     Monitor and Evaluation  Food and Nutrient Intake: food and beverage intake  Food and Nutrient Adminstration: diet order  Physical Activity and Function: nutrition-related ADLs and IADLs  Anthropometric Measurements: weight  Biochemical Data, Medical Tests and Procedures: electrolyte and renal panel  Nutrition-Focused Physical Findings: overall appearance     Nutrition Follow-Up  RD Follow-up?: Yes

## 2018-04-23 NOTE — PT/OT/SLP PROGRESS
Physical Therapy      Patient Name:  Albina Garcia   MRN:  [id number]    Patient not seen today secondary to off of floor in HD. Will follow-up as able.    Elham Sams, PT

## 2018-04-23 NOTE — ASSESSMENT & PLAN NOTE
-previous PTCA/stent to D1 & D2 with PTCA RCA  -previous echo with normal LVEF; repeat echo pending  -continue medical management with ASA, statin, BB, ARB and Plavix

## 2018-04-23 NOTE — CONSULTS
[facility]  Cardiology  Consult Note    Patient Name: Albina Garcia  MRN: [id number]  Admission Date: 4/21/2018  Hospital Length of Stay: 0 days  Code Status: Full Code   Attending Provider: Dr. RAZA Holman  Consulting Provider: David Montgomery MD  Primary Care Physician: Judie Dsouza MD  Principal Problem:Elevated troponin level    Patient information was obtained from patient and chart review     Consults  Subjective:     Chief Complaint:  Weakness     HPI:   86 year old AAF presenting to [facility] from [facility] via her nursing home with complaints of worsening fatigue. She has a PMH of CAD s/p PCI (D2 with 2.25 x 14 mm Resolute MATTHEW, POBA of RCA with 2.0 mm 9/2017 + D1 PCI 2.25 x 14 mm Resolute MATTHEW 5/2017), HTN, HLP, ESRD on HD, diastolic dysfunction, DM II, and nursing home resident. She has a UTI in addition to abnormal TNI. She has a cough that is new but no fevers.       Trop 1.199  NT pro-BNP >97768  HgA1c 7.0  ECG ST depression in inferior lateral leads-new  CXR-increased vascular markings    H/H 9.8/31.7            Past Medical History:   Diagnosis Date    Anemia in CKD (chronic kidney disease) 10/14/2013    Arthritis     Back pain, chronic     Blood transfusion     CHF (congestive heart failure)     Diabetes mellitus     Elevated cholesterol     ESRD on hemodialysis     esrd    HTN (hypertension) 10/14/2013    Metabolic bone disease 10/14/2013       Past Surgical History:   Procedure Laterality Date    SPINAL FUSION      TOTAL ABDOMINAL HYSTERECTOMY      TOTAL KNEE ARTHROPLASTY Left     left       Review of patient's allergies indicates:   Allergen Reactions    Penicillins        No current facility-administered medications on file prior to encounter.      Current Outpatient Prescriptions on File Prior to Encounter   Medication Sig    acetaminophen (TYLENOL) 325 MG tablet Take 2 tablets (650 mg total) by mouth every 8 (eight) hours as needed for Pain or Temperature greater than  (100.4 F).    aspirin 81 MG Chew Take 1 tablet (81 mg total) by mouth once daily.    atorvastatin (LIPITOR) 40 MG tablet Take 1 tablet (40 mg total) by mouth once daily. (Patient taking differently: Take 40 mg by mouth nightly. )    BD INSULIN SYRINGE ULTRA-FINE 0.5 mL 31 gauge x 5/16 Syrg USE TO INJECT INSULIN 2-3 TIMES A DAY    calcium acetate (PHOSLO) 667 mg capsule TAKE 2 CAPSULES BY MOUTH 3 TIMES A DAY WITH MEALS    carvedilol (COREG) 6.25 MG tablet Take 1 tablet (6.25 mg total) by mouth 2 (two) times daily.    clopidogrel (PLAVIX) 75 mg tablet Take 1 tablet (75 mg total) by mouth once daily.    dextromethorphan-guaifenesin  mg Tab Take 1 tablet by mouth every 8 (eight) hours as needed.    DICYCLOMINE HCL (GI COCKTAIL SIMPLE RECORD) GI Cocktail (Mylanta 30 mL, 2% viscous lidocaine 10 mL, dicyclomine 10 mL)    hydrALAZINE (APRESOLINE) 100 MG tablet Take 1 tablet (100 mg total) by mouth 3 (three) times daily.    insulin aspart (NOVOLOG) 100 unit/mL InPn pen Inject 0-5 Units into the skin before meals and at bedtime as needed (Hyperglycemia).    irbesartan (AVAPRO) 300 MG tablet Take 300 mg by mouth once daily.    loperamide (IMODIUM A-D) 2 mg Tab Take 2 mg by mouth 4 (four) times daily as needed.    meclizine (ANTIVERT) 25 mg tablet Take 1 tablet (25 mg total) by mouth 3 (three) times daily as needed for Dizziness.    nitroGLYCERIN (NITROSTAT) 0.4 MG SL tablet Place 1 tablet (0.4 mg total) under the tongue every 5 (five) minutes as needed for Chest pain (maximum of 3 tablets in 15 minutes.).    omeprazole (PRILOSEC) 40 MG capsule Take 40 mg by mouth once daily.    polyvinyl alcohol, artificial tears, (LIQUIFILM TEARS) 1.4 % ophthalmic solution Place 1 drop into both eyes as needed.    propylene glycol (SYSTANE BALANCE) 0.6 % Drop Place 1 drop into both eyes 2 (two) times daily.    ranitidine (ZANTAC) 300 MG tablet Take 1 tablet (300 mg total) by mouth every evening.    SENSIPAR 30 mg Tab  TAKE 1 TABLET EVERY DAY    vitamin renal formula, B-complex-vitamin c-folic acid, (NEPHROCAP) 1 mg Cap Take 1 capsule by mouth once daily.    white petrolatum-mineral oil 57.3-42.5% (REFRESH P.M.) 57.3-42.5 % Oint every evening.     Family History     Problem Relation (Age of Onset)    Diabetes Sister    Kidney disease Mother        Social History Main Topics    Smoking status: Former Smoker    Smokeless tobacco: Never Used    Alcohol use No    Drug use: No    Sexual activity: Not on file     Review of Systems   Constitution: Positive for decreased appetite, weakness, malaise/fatigue and weight loss. Negative for diaphoresis, night sweats and weight gain.   HENT: Negative for congestion.    Eyes: Negative for blurred vision, discharge and double vision.   Cardiovascular: Negative for chest pain, claudication, cyanosis, dyspnea on exertion, irregular heartbeat, leg swelling, near-syncope, orthopnea, palpitations, paroxysmal nocturnal dyspnea and syncope.   Respiratory: Positive for cough and sputum production. Negative for shortness of breath and wheezing.    Endocrine: Negative for cold intolerance, heat intolerance and polyphagia.   Hematologic/Lymphatic: Negative for adenopathy and bleeding problem. Does not bruise/bleed easily.   Skin: Negative for dry skin and nail changes.   Musculoskeletal: Positive for muscle weakness. Negative for arthritis, back pain, falls, joint pain, myalgias and neck pain.   Gastrointestinal: Negative for bloating, abdominal pain, change in bowel habit and constipation.   Genitourinary: Negative for bladder incontinence, dysuria, flank pain, genital sores and missed menses.   Neurological: Negative for aphonia, brief paralysis, difficulty with concentration and dizziness.   Psychiatric/Behavioral: Negative for altered mental status and memory loss. The patient does not have insomnia.    Allergic/Immunologic: Negative for environmental allergies.     Objective:     Vital Signs  (Most Recent):  Temp: 98.8 °F (37.1 °C) (04/22/18 2101)  Pulse: 85 (04/22/18 2101)  Resp: 18 (04/22/18 2101)  BP: (!) 106/53 (04/22/18 2101)  SpO2: 100 % (04/22/18 2017) Vital Signs (24h Range):  Temp:  [97.9 °F (36.6 °C)-99.6 °F (37.6 °C)] 98.8 °F (37.1 °C)  Pulse:  [77-96] 85  Resp:  [16-20] 18  SpO2:  [95 %-100 %] 100 %  BP: (106-160)/(53-76) 106/53     Weight: 53.5 kg (117 lb 15.1 oz)  Body mass index is 19.63 kg/m².    SpO2: 100 %  O2 Device (Oxygen Therapy): room air      Intake/Output Summary (Last 24 hours) at 04/22/18 2325  Last data filed at 04/22/18 1838   Gross per 24 hour   Intake              580 ml   Output             2401 ml   Net            -1821 ml       Lines/Drains/Airways     Central Venous Catheter Line                 Hemodialysis Catheter Arteriovenous fistula Left Arm -- days          Drain                 Hemodialysis AV Fistula Left upper arm -- days          Peripheral Intravenous Line                 Peripheral IV - Single Lumen 04/21/18 1814 Right Ankle 1 day                Physical Exam   Constitutional: She is oriented to person, place, and time. Vital signs are normal. She appears well-developed and well-nourished. She is not intubated.   Elderly female appearing tired    HENT:   Head: Normocephalic and atraumatic.   Right Ear: External ear normal.   Left Ear: External ear normal.   Mouth/Throat: Oropharynx is clear and moist.   Eyes: Conjunctivae and EOM are normal. Pupils are equal, round, and reactive to light. Right eye exhibits no discharge. Left eye exhibits no discharge. No scleral icterus.   Neck: Normal range of motion. Neck supple. Normal carotid pulses, no hepatojugular reflux and no JVD present. Carotid bruit is not present. No tracheal deviation present. No thyromegaly present.   Cardiovascular: Normal rate, regular rhythm, S1 normal and S2 normal.   No extrasystoles are present. PMI is not displaced.  Exam reveals no gallop, no S3, no distant heart sounds, no friction  rub and no midsystolic click.    Murmur heard.   Systolic murmur is present with a grade of 2/6  at the upper right sternal border  Pulses:       Carotid pulses are 2+ on the right side, and 2+ on the left side.       Radial pulses are 2+ on the right side, and 2+ on the left side.        Femoral pulses are 2+ on the right side, and 2+ on the left side.       Popliteal pulses are 2+ on the right side, and 2+ on the left side.        Dorsalis pedis pulses are 1+ on the right side, and 1+ on the left side.        Posterior tibial pulses are 1+ on the right side, and 1+ on the left side.     LUE AVF with a thrill    Pulmonary/Chest: Effort normal and breath sounds normal. No accessory muscle usage or stridor. No apnea, no tachypnea and no bradypnea. She is not intubated. No respiratory distress. She has no decreased breath sounds. She has no wheezes. She has no rales. She exhibits no tenderness and no bony tenderness.   Abdominal: She exhibits no distension, no pulsatile liver, no abdominal bruit, no ascites, no pulsatile midline mass and no mass. There is no tenderness. There is no rebound and no guarding.   Musculoskeletal: Normal range of motion. She exhibits no edema or tenderness.   Lymphadenopathy:     She has no cervical adenopathy.   Neurological: She is alert and oriented to person, place, and time. She has normal reflexes. No cranial nerve deficit. Coordination normal.   Skin: Skin is warm. No rash noted. No erythema. No pallor.   Psychiatric: She has a normal mood and affect. Her behavior is normal. Judgment and thought content normal.       Significant Labs:     LABS  CBC    Recent Labs  Lab 04/21/18 1813 04/22/18  0505   WBC 10.51 9.27   RBC 3.37* 3.47*   HGB 9.8* 10.0*   HCT 31.7* 32.2*    207   MCV 94 93   MCH 29.1 28.8   MCHC 30.9* 31.1*     BMP    Recent Labs  Lab 04/21/18  1813 04/22/18  0505   * 132*   K 4.0 4.5   CO2 29 25   CL 91* 91*   BUN 42* 46*   CREATININE 4.40* 5.4*   *  332*       POCT-Glucose  POCT Glucose   Date Value Ref Range Status   04/22/2018 315 (H) 70 - 110 mg/dL Final   04/22/2018 344 (H) 70 - 110 mg/dL Final   04/22/2018 311 (H) 70 - 110 mg/dL Final         Recent Labs  Lab 04/21/18  1813 04/22/18  0505   CALCIUM 8.9 9.4   MG  --  2.0   PHOS  --  2.9     LFT    Recent Labs  Lab 04/21/18  1813 04/22/18  0505   PROT 7.4 7.5   ALBUMIN 4.2 3.5   BILITOT 0.7 0.7   AST 39 31   ALKPHOS 297* 295*   ALT 23 21     GFR    COAGS  No results for input(s): PT, INR, APTT in the last 168 hours.  CE    Recent Labs  Lab 04/21/18 1813 04/21/18  2308 04/22/18  0505   TROPONINI 0.547* 1.199* 1.942*         LAST HbA1c  Lab Results   Component Value Date    HGBA1C 7.0 (H) 04/21/2018       Lipid panel  Lab Results   Component Value Date    CHOL 102 (L) 04/22/2018    CHOL 173 05/18/2017    CHOL 120 10/14/2013     Lab Results   Component Value Date    HDL 45 04/22/2018    HDL 36 (L) 05/18/2017    HDL 47 10/14/2013     Lab Results   Component Value Date    LDLCALC 44.0 (L) 04/22/2018    LDLCALC 115.4 05/18/2017    LDLCALC 62.4 (L) 10/14/2013     Lab Results   Component Value Date    TRIG 65 04/22/2018    TRIG 108 05/18/2017    TRIG 53 10/14/2013     Lab Results   Component Value Date    CHOLHDL 44.1 04/22/2018    CHOLHDL 20.8 05/18/2017    CHOLHDL 39.2 10/14/2013          Significant Imaging:       Imaging Results    None           Assessment and Plan:     * Elevated troponin level        Plan written under CAD                Acute cystitis without hematuria        Abx initiated per primary team               Coronary artery disease involving native coronary artery        S/p PCI of D1, D2, and RCA  Normal EF        Presenting with abnormal TNI      She appears very frail   Will favor a conservative approach vs invasive        Medical therapy   Aspirin   Statin   Beta-blocker if tolerated   Acei if tolerated   Plavix 75 mg daily        Echo to assess for WMAs        Further recommendations to  follow               Type II diabetes mellitus with end-stage renal disease        Primary team will management accordingly         Chronic diastolic congestive heart failure        HD for volume management        Hypertensive heart failure with end stage renal disease on dialysis        Continue with current plan            VTE Risk Mitigation         Ordered     heparin (porcine) injection 5,000 Units  Every 8 hours      04/21/18 2257     IP VTE HIGH RISK PATIENT  Once      04/21/18 2257          Thank you for your consult.         David Montgomery MD  Cardiology   [facility]

## 2018-04-23 NOTE — PROGRESS NOTES
Progress Note  Nephrology      Consult Requested By: William Johnson III, MD      SUBJECTIVE:     Overnight events  Patient is a 86 y.o. female     Patient Active Problem List   Diagnosis    Metabolic bone disease    Hypertensive heart failure with end stage renal disease on dialysis    Anemia in ESRD (end-stage renal disease)    Chronic diastolic congestive heart failure    Type II diabetes mellitus with end-stage renal disease    Diabetic autonomic neuropathy associated with type 2 diabetes mellitus    Stenosis of arteriovenous dialysis fistula    ESRD (end stage renal disease) on dialysis    HTN (hypertension)    Coronary artery disease involving native coronary artery    Upper respiratory infection    Chest pain    NSTEMI (non-ST elevated myocardial infarction)    GERD (gastroesophageal reflux disease)    Abnormal serum lipase level    Hypophosphatemia    Elevated troponin    Elevated troponin level    Acute cystitis without hematuria     Past Medical History:   Diagnosis Date    Anemia in CKD (chronic kidney disease) 10/14/2013    Arthritis     Back pain, chronic     Blood transfusion     CHF (congestive heart failure)     Diabetes mellitus     Elevated cholesterol     ESRD on hemodialysis     esrd    HTN (hypertension) 10/14/2013    Metabolic bone disease 10/14/2013              OBJECTIVE:     Vitals:    04/23/18 1525 04/23/18 1600 04/23/18 1644 04/23/18 1657   BP: (!) 163/72   (!) 148/68   BP Location: Right arm      Patient Position: Lying      Pulse: 84 79 82 82   Resp: 18  18    Temp: 96 °F (35.6 °C)      TempSrc: Oral      SpO2:   99%    Weight:       Height:           Temp: 96 °F (35.6 °C) (04/23/18 1525)  Pulse: 82 (04/23/18 1657)  Resp: 18 (04/23/18 1644)  BP: (!) 148/68 (04/23/18 1657)  SpO2: 99 % (04/23/18 1644)      Date 04/23/18 0700 - 04/24/18 0659   Shift 8748-2789 4910-3580 2888-7836 24 Hour Total   I  N  T  A  K  E   Other 400   400    Shift Total  (mL/kg) 400  (7.5)    400  (7.5)   O  U  T  P  U  T   Other 2400   2400    Shift Total  (mL/kg) 2400  (44.9)   2400  (44.9)   Weight (kg) 53.5 53.5 53.5 53.5             Medications:   sodium chloride 0.9%   Intravenous Once    sodium chloride 0.9%   Intravenous Once    albuterol-ipratropium 2.5mg-0.5mg/3mL  3 mL Nebulization Q4H    aspirin  81 mg Oral Daily    atorvastatin  40 mg Oral Nightly    calcium acetate  1,334 mg Oral TID WM    carvedilol  6.25 mg Oral BID    cefTRIAXone (ROCEPHIN) IVPB  1 g Intravenous Q24H    cinacalcet  30 mg Oral Daily    clopidogrel  75 mg Oral Daily    furosemide  40 mg Oral Daily    heparin (porcine)  5,000 Units Subcutaneous Q8H    hydrALAZINE  100 mg Oral TID    irbesartan  300 mg Oral Daily    lidocaine  2 patch Transdermal Q24H    pantoprazole  40 mg Oral Daily    vitamin renal formula (B-complex-vitamin c-folic acid)  1 capsule Oral Daily                 Physical Exam:  General appearance: NAD  Cough  No SOB  Lungs: coarse  Heart: pulse 80  Abdomen: soft  Extremities: no edema  Skin: dry      Laboratory:  ABG  Labs reviewed  No results found for this or any previous visit (from the past 336 hour(s)).  Recent Results (from the past 336 hour(s))   CBC with Automated Differential    Collection Time: 04/23/18  8:10 AM   Result Value Ref Range    WBC 5.70 3.90 - 12.70 K/uL    Hemoglobin 9.4 (L) 12.0 - 16.0 g/dL    Hematocrit 29.5 (L) 37.0 - 48.5 %    Platelets 230 150 - 350 K/uL   CBC with Automated Differential    Collection Time: 04/22/18  5:05 AM   Result Value Ref Range    WBC 9.27 3.90 - 12.70 K/uL    Hemoglobin 10.0 (L) 12.0 - 16.0 g/dL    Hematocrit 32.2 (L) 37.0 - 48.5 %    Platelets 207 150 - 350 K/uL   CBC auto differential    Collection Time: 04/21/18  6:13 PM   Result Value Ref Range    WBC 10.51 3.90 - 12.70 K/uL    Hemoglobin 9.8 (L) 12.0 - 16.0 g/dL    Hematocrit 31.7 (L) 37.0 - 48.5 %    Platelets 220 150 - 350 K/uL     Urinalysis  No results for input(s): COLORU,  CLARITYU, SPECGRAV, PHUR, PROTEINUA, GLUCOSEU, BILIRUBINCON, BLOODU, WBCU, RBCU, BACTERIA, MUCUS, NITRITE, LEUKOCYTESUR, UROBILINOGEN, HYALINECASTS in the last 24 hours.    Diagnostic Results:  X-Ray: Reviewed  US: Reviewed  Echo: Reviewed  ACCESS    ASSESSMENT/PLAN:   ESRD  Hyponatremia  MBD  Anemia  Poor nutrition  /68  Weight daily  Renal diet  HD M-W-F

## 2018-04-23 NOTE — SUBJECTIVE & OBJECTIVE
Review of Systems   Constitution: Negative for chills, decreased appetite, diaphoresis, fever and weakness.   Cardiovascular: Negative for chest pain, claudication, cyanosis, dyspnea on exertion, irregular heartbeat, leg swelling, near-syncope, orthopnea, palpitations, paroxysmal nocturnal dyspnea and syncope.   Respiratory: Negative for cough, hemoptysis, shortness of breath and wheezing.    Gastrointestinal: Negative for bloating, abdominal pain, constipation, diarrhea, melena, nausea and vomiting.   Neurological: Negative for dizziness.   All other systems reviewed and are negative.    Objective:     Vital Signs (Most Recent):  Temp: 97.5 °F (36.4 °C) (04/23/18 0833)  Pulse: 78 (04/23/18 1200)  Resp: 18 (04/23/18 1151)  BP: (!) 100/53 (04/23/18 1115)  SpO2: (!) 94 % (04/23/18 1151) Vital Signs (24h Range):  Temp:  [97.5 °F (36.4 °C)-99.6 °F (37.6 °C)] 97.5 °F (36.4 °C)  Pulse:  [77-90] 78  Resp:  [16-20] 18  SpO2:  [94 %-100 %] 94 %  BP: ()/(50-72) 100/53     Weight: 53.5 kg (117 lb 15.1 oz)  Body mass index is 19.63 kg/m².     SpO2: (!) 94 %  O2 Device (Oxygen Therapy): room air      Intake/Output Summary (Last 24 hours) at 04/23/18 1217  Last data filed at 04/22/18 1838   Gross per 24 hour   Intake              580 ml   Output             2400 ml   Net            -1820 ml       Lines/Drains/Airways     Central Venous Catheter Line                 Hemodialysis Catheter Arteriovenous fistula Left Arm -- days          Drain                 Hemodialysis AV Fistula Left upper arm -- days          Peripheral Intravenous Line                 Peripheral IV - Single Lumen 04/21/18 1814 Right Ankle 1 day                Physical Exam   Constitutional: She is oriented to person, place, and time. She appears well-developed and well-nourished. No distress.   Cardiovascular: Normal rate and regular rhythm.  Exam reveals no gallop.    No murmur heard.  Pulmonary/Chest: Effort normal. No respiratory distress. She has  decreased breath sounds. She has no wheezes.   Abdominal: Soft. Bowel sounds are normal. She exhibits no distension. There is no tenderness.   Neurological: She is alert and oriented to person, place, and time.   Skin: Skin is warm and dry.       Significant Labs:       Recent Labs  Lab 04/23/18  0810   TROPONINI 4.059*       Recent Labs  Lab 04/23/18  0810   *   K 4.6   CL 89*   CO2 24   BUN 68*   CREATININE 7.2*   MG 2.3       Recent Labs  Lab 04/23/18  0810   WBC 5.70   RBC 3.23*   HGB 9.4*   HCT 29.5*      MCV 91   MCH 29.1   MCHC 31.9*       Significant Imaging: Echocardiogram:   2D echo with color flow doppler: Repeat echo ordered with pending results   Results for orders placed or performed during the hospital encounter of 09/13/17   2D echo with color flow doppler   Result Value Ref Range    EF 55 55 - 65    Mitral Valve Regurgitation MILD     Diastolic Dysfunction Yes (A)     Pericardial Effusion SMALL (A)     Tricuspid Valve Regurgitation MILD

## 2018-04-23 NOTE — PROGRESS NOTES
Progress Note  [facility]  Admit Date: 4/21/2018   LOS: 1 day   SUBJECTIVE:       Patient was awake, alert and oriented x 2. She knew place and time but was having trouble with people and finding some words. She denies any cp, sob, nausea, vomiting, fever, chills and cough.     ROS  Denies N/V/F/C/CP/SOB.   OBJECTIVE:   Vital Signs (Most Recent)  Temp: 98 °F (36.7 °C) (04/23/18 0807)  Pulse: 80 (04/23/18 0807)  Resp: 18 (04/23/18 0807)  BP: 131/63 (04/23/18 0807)  SpO2: 98 % (04/23/18 0434)    I & O (Last 24H):  Intake/Output Summary (Last 24 hours) at 04/23/18 0846  Last data filed at 04/22/18 1838   Gross per 24 hour   Intake              580 ml   Output             2400 ml   Net            -1820 ml     Wt Readings from Last 3 Encounters:   04/22/18 53.5 kg (117 lb 15.1 oz)   04/21/18 61.2 kg (135 lb)   02/04/18 58.8 kg (129 lb 10.1 oz)       Current Diet Order   Procedures    Diet NPO        Physical Exam  Constitutional: She is oriented to person, place, and time. No distress.   Appears stated age, elderly, frail   HENT:   Head: Normocephalic and atraumatic.   Mouth/Throat: No oropharyngeal exudate.   Neck: Normal range of motion. Neck supple. No JVD present.   Cardiovascular: Normal rate, regular rhythm, normal heart sounds and intact distal pulses.    No murmur heard.  Left upper arm fistula w/ good thrill   Pulmonary/Chest: Effort normal. No respiratory distress. She has no wheezes. She has rales and crackles in lower bases.   Abdominal: Soft. Bowel sounds are normal. She exhibits no distension and no mass. There is no tenderness.   Musculoskeletal: Normal range of motion. She exhibits no edema or deformity.   Neurological: She is alert and oriented to person, place, and time. No cranial nerve deficit.   Skin: Capillary refill takes less than 2 seconds. She is not diaphoretic.   Psychiatric: She has a normal mood and affect. Her behavior is normal.   Nursing note and vitals reviewed.     Laboratory  Data:  CBC    Recent Labs  Lab 04/21/18  1813 04/22/18  0505   WBC 10.51 9.27   RBC 3.37* 3.47*   HGB 9.8* 10.0*   HCT 31.7* 32.2*    207   MCV 94 93   MCH 29.1 28.8   MCHC 30.9* 31.1*     CMP    Recent Labs  Lab 04/21/18  1813 04/22/18  0505   CALCIUM 8.9 9.4   PROT 7.4 7.5   * 132*   K 4.0 4.5   CO2 29 25   CL 91* 91*   BUN 42* 46*   CREATININE 4.40* 5.4*   ALKPHOS 297* 295*   ALT 23 21   AST 39 31   BILITOT 0.7 0.7     POCT-Glucose  POCT Glucose   Date Value Ref Range Status   04/23/2018 227 (H) 70 - 110 mg/dL Final   04/22/2018 315 (H) 70 - 110 mg/dL Final   04/22/2018 344 (H) 70 - 110 mg/dL Final   04/22/2018 311 (H) 70 - 110 mg/dL Final     COAGS  No results for input(s): PT, INR, APTT in the last 168 hours.  UA  No results for input(s): COLORU, CLARITYU, SPECGRAV, PHUR, PROTEINUA, GLUCOSEU, BLOODU, WBCU, RBCU, BACTERIA, MUCUS in the last 24 hours.    Invalid input(s):  BILIRUBINCON  MICRO  Microbiology Results (last 7 days)     ** No results found for the last 168 hours. **        LIPID PANEL  Lab Results   Component Value Date    CHOL 102 (L) 04/22/2018     Lab Results   Component Value Date    HDL 45 04/22/2018     Lab Results   Component Value Date    LDLCALC 44.0 (L) 04/22/2018     Lab Results   Component Value Date    TRIG 65 04/22/2018     Lab Results   Component Value Date    CHOLHDL 44.1 04/22/2018     ASSESSMENT/PLAN:   Albina Garcia is a 86 y.o. female     Hypertensive heart failure with end stage renal disease on dialysis M/W/F  BNP > 70,000 but with no leg swelling and some wheezing  Last ECHO was 9/17: EF of 55%, DD and mild MR and TR    Avoiding IVF fluids for now as CXR demonstrates likely overload  Consult Nephrology- Fremin  BP stable, albeit slightly high On irbesartan 200 mg daily, hydarlazine 100 mg po TID, Coreg 6.25 BID, Lipitor  Volume overload, got dialyzed yesterday    NSTEMI  Trop in ED 0.547 > 1.199 >1.94  No chest pain   EKG with no ST changes, but + for flipped T  waves in inferior leads  Cardiology consulted, will appreciate further recs  Already on ASA, plavix, statin  Echo ordered     Acute cystitis without hematuria  Urine 2+ leuks, >100 WBCs, with many bacteria, nitrite neg  Urine culture pending, with susceptibilities  Avoiding nitrofurantin and bactrim due to geriatric population and renal disease  Giving 5 days of Rocephin for additional respiratory coverage    GERD:   Continue PPI. Stable     URI:   Sputum color change from white to yellow, non-smoker  In nursing home, so potentially would need to cover for HAP  Xray demonstrates bilat infiltrates vs edema  Given dose of levo x 1 in ED.  On rocephin x 5 days for UTI + possible PNA (was taking Z-armani)    ESRD:   MWF Dialysis  Cr: 4.4, GFR: 9.8  Completed full dialysis Friday  Dialysis yesterday and possibly today as well.   Will appreciate nephro recs  Daily labs      PPx: Heparin    Case discussed with staff    4/23/2018 Beny Faria MD  8:46 AM

## 2018-04-23 NOTE — PLAN OF CARE
Problem: Patient Care Overview  Goal: Plan of Care Review  Outcome: Ongoing (interventions implemented as appropriate)  Plan of care reviewed with patient. Patient verbalized understanding. Fall precautions maintained. Pt tolerated dialysis well. Dialysis MWF.Bed in lowest position, call light within reach, 2x bed rails, pt wearing slip resistant socks. Patient notified to ask staff for assistance and pt verbalized complete understanding. Pt on telemetry with no ectopy noted. Will continue to monitor.

## 2018-04-24 PROBLEM — Z71.89 COUNSELING REGARDING ADVANCED CARE PLANNING AND GOALS OF CARE: Status: ACTIVE | Noted: 2018-04-24

## 2018-04-24 PROBLEM — Z71.89 GOALS OF CARE, COUNSELING/DISCUSSION: Status: ACTIVE | Noted: 2018-04-24

## 2018-04-24 PROBLEM — Z51.5 PALLIATIVE CARE ENCOUNTER: Status: ACTIVE | Noted: 2018-04-24

## 2018-04-24 PROBLEM — H47.299 PALLOR OF OPTIC DISC: Status: ACTIVE | Noted: 2018-04-24

## 2018-04-24 LAB
ALBUMIN SERPL BCP-MCNC: 3.4 G/DL
ALP SERPL-CCNC: 272 U/L
ALT SERPL W/O P-5'-P-CCNC: 18 U/L
ANION GAP SERPL CALC-SCNC: 14 MMOL/L
AST SERPL-CCNC: 31 U/L
BASOPHILS # BLD AUTO: 0.03 K/UL
BASOPHILS NFR BLD: 0.5 %
BILIRUB SERPL-MCNC: 0.7 MG/DL
BUN SERPL-MCNC: 31 MG/DL
CALCIUM SERPL-MCNC: 9.5 MG/DL
CHLORIDE SERPL-SCNC: 95 MMOL/L
CO2 SERPL-SCNC: 24 MMOL/L
CREAT SERPL-MCNC: 4 MG/DL
DIFFERENTIAL METHOD: ABNORMAL
EOSINOPHIL # BLD AUTO: 0.1 K/UL
EOSINOPHIL NFR BLD: 1.8 %
ERYTHROCYTE [DISTWIDTH] IN BLOOD BY AUTOMATED COUNT: 16.9 %
EST. GFR  (AFRICAN AMERICAN): 11 ML/MIN/1.73 M^2
EST. GFR  (NON AFRICAN AMERICAN): 10 ML/MIN/1.73 M^2
GLUCOSE SERPL-MCNC: 184 MG/DL
HCT VFR BLD AUTO: 32.2 %
HGB BLD-MCNC: 10.1 G/DL
LYMPHOCYTES # BLD AUTO: 1.1 K/UL
LYMPHOCYTES NFR BLD: 20.3 %
MAGNESIUM SERPL-MCNC: 2.3 MG/DL
MCH RBC QN AUTO: 29 PG
MCHC RBC AUTO-ENTMCNC: 31.4 G/DL
MCV RBC AUTO: 93 FL
MONOCYTES # BLD AUTO: 0.8 K/UL
MONOCYTES NFR BLD: 14.1 %
NEUTROPHILS # BLD AUTO: 3.5 K/UL
NEUTROPHILS NFR BLD: 62.9 %
PHOSPHATE SERPL-MCNC: 2.7 MG/DL
PLATELET # BLD AUTO: 229 K/UL
PMV BLD AUTO: 11.7 FL
POCT GLUCOSE: 200 MG/DL (ref 70–110)
POCT GLUCOSE: 216 MG/DL (ref 70–110)
POCT GLUCOSE: 250 MG/DL (ref 70–110)
POCT GLUCOSE: 307 MG/DL (ref 70–110)
POTASSIUM SERPL-SCNC: 4.6 MMOL/L
PROT SERPL-MCNC: 8 G/DL
RBC # BLD AUTO: 3.48 M/UL
SODIUM SERPL-SCNC: 133 MMOL/L
TROPONIN I SERPL DL<=0.01 NG/ML-MCNC: 2.85 NG/ML
WBC # BLD AUTO: 5.52 K/UL

## 2018-04-24 PROCEDURE — 94664 DEMO&/EVAL PT USE INHALER: CPT

## 2018-04-24 PROCEDURE — 63600175 PHARM REV CODE 636 W HCPCS: Performed by: STUDENT IN AN ORGANIZED HEALTH CARE EDUCATION/TRAINING PROGRAM

## 2018-04-24 PROCEDURE — 25000003 PHARM REV CODE 250: Performed by: FAMILY MEDICINE

## 2018-04-24 PROCEDURE — 85025 COMPLETE CBC W/AUTO DIFF WBC: CPT

## 2018-04-24 PROCEDURE — 84484 ASSAY OF TROPONIN QUANT: CPT

## 2018-04-24 PROCEDURE — G8979 MOBILITY GOAL STATUS: HCPCS | Mod: CI

## 2018-04-24 PROCEDURE — 97161 PT EVAL LOW COMPLEX 20 MIN: CPT

## 2018-04-24 PROCEDURE — 12000002 HC ACUTE/MED SURGE SEMI-PRIVATE ROOM

## 2018-04-24 PROCEDURE — G8987 SELF CARE CURRENT STATUS: HCPCS | Mod: CL

## 2018-04-24 PROCEDURE — 84100 ASSAY OF PHOSPHORUS: CPT

## 2018-04-24 PROCEDURE — 94761 N-INVAS EAR/PLS OXIMETRY MLT: CPT

## 2018-04-24 PROCEDURE — 25000003 PHARM REV CODE 250: Performed by: INTERNAL MEDICINE

## 2018-04-24 PROCEDURE — 97530 THERAPEUTIC ACTIVITIES: CPT

## 2018-04-24 PROCEDURE — 97165 OT EVAL LOW COMPLEX 30 MIN: CPT

## 2018-04-24 PROCEDURE — G8978 MOBILITY CURRENT STATUS: HCPCS | Mod: CL

## 2018-04-24 PROCEDURE — 25000242 PHARM REV CODE 250 ALT 637 W/ HCPCS: Performed by: FAMILY MEDICINE

## 2018-04-24 PROCEDURE — 99233 SBSQ HOSP IP/OBS HIGH 50: CPT | Mod: ,,, | Performed by: NURSE PRACTITIONER

## 2018-04-24 PROCEDURE — 80053 COMPREHEN METABOLIC PANEL: CPT

## 2018-04-24 PROCEDURE — 83735 ASSAY OF MAGNESIUM: CPT

## 2018-04-24 PROCEDURE — 94640 AIRWAY INHALATION TREATMENT: CPT

## 2018-04-24 PROCEDURE — 36415 COLL VENOUS BLD VENIPUNCTURE: CPT

## 2018-04-24 PROCEDURE — G8988 SELF CARE GOAL STATUS: HCPCS | Mod: CK

## 2018-04-24 PROCEDURE — 25000003 PHARM REV CODE 250: Performed by: STUDENT IN AN ORGANIZED HEALTH CARE EDUCATION/TRAINING PROGRAM

## 2018-04-24 PROCEDURE — 99900035 HC TECH TIME PER 15 MIN (STAT)

## 2018-04-24 RX ORDER — AMOXICILLIN 250 MG
1 CAPSULE ORAL DAILY PRN
Status: DISCONTINUED | OUTPATIENT
Start: 2018-04-24 | End: 2018-04-25 | Stop reason: HOSPADM

## 2018-04-24 RX ORDER — FUROSEMIDE 40 MG/1
80 TABLET ORAL ONCE
Status: COMPLETED | OUTPATIENT
Start: 2018-04-24 | End: 2018-04-24

## 2018-04-24 RX ADMIN — FUROSEMIDE 80 MG: 40 TABLET ORAL at 03:04

## 2018-04-24 RX ADMIN — PANTOPRAZOLE SODIUM 40 MG: 40 TABLET, DELAYED RELEASE ORAL at 08:04

## 2018-04-24 RX ADMIN — ATORVASTATIN CALCIUM 40 MG: 40 TABLET, FILM COATED ORAL at 09:04

## 2018-04-24 RX ADMIN — HEPARIN SODIUM 5000 UNITS: 5000 INJECTION, SOLUTION INTRAVENOUS; SUBCUTANEOUS at 08:04

## 2018-04-24 RX ADMIN — CALCIUM ACETATE 667 MG: 667 CAPSULE ORAL at 08:04

## 2018-04-24 RX ADMIN — HEPARIN SODIUM 5000 UNITS: 5000 INJECTION, SOLUTION INTRAVENOUS; SUBCUTANEOUS at 04:04

## 2018-04-24 RX ADMIN — CLOPIDOGREL BISULFATE 75 MG: 75 TABLET ORAL at 08:04

## 2018-04-24 RX ADMIN — CINACALCET HYDROCHLORIDE 30 MG: 30 TABLET, COATED ORAL at 08:04

## 2018-04-24 RX ADMIN — INSULIN ASPART 2 UNITS: 100 INJECTION, SOLUTION INTRAVENOUS; SUBCUTANEOUS at 08:04

## 2018-04-24 RX ADMIN — HYPROMELLOSE 2910 2 DROP: 5 SOLUTION OPHTHALMIC at 09:04

## 2018-04-24 RX ADMIN — CARVEDILOL 6.25 MG: 6.25 TABLET, FILM COATED ORAL at 09:04

## 2018-04-24 RX ADMIN — HYDRALAZINE HYDROCHLORIDE 100 MG: 25 TABLET, FILM COATED ORAL at 03:04

## 2018-04-24 RX ADMIN — IPRATROPIUM BROMIDE AND ALBUTEROL SULFATE 3 ML: .5; 3 SOLUTION RESPIRATORY (INHALATION) at 04:04

## 2018-04-24 RX ADMIN — STANDARDIZED SENNA CONCENTRATE AND DOCUSATE SODIUM 1 TABLET: 8.6; 5 TABLET, FILM COATED ORAL at 06:04

## 2018-04-24 RX ADMIN — INSULIN ASPART 1 UNITS: 100 INJECTION, SOLUTION INTRAVENOUS; SUBCUTANEOUS at 09:04

## 2018-04-24 RX ADMIN — CALCIUM ACETATE 667 MG: 667 CAPSULE ORAL at 12:04

## 2018-04-24 RX ADMIN — IPRATROPIUM BROMIDE AND ALBUTEROL SULFATE 3 ML: .5; 3 SOLUTION RESPIRATORY (INHALATION) at 07:04

## 2018-04-24 RX ADMIN — HYDRALAZINE HYDROCHLORIDE 100 MG: 25 TABLET, FILM COATED ORAL at 09:04

## 2018-04-24 RX ADMIN — IPRATROPIUM BROMIDE AND ALBUTEROL SULFATE 3 ML: .5; 3 SOLUTION RESPIRATORY (INHALATION) at 08:04

## 2018-04-24 RX ADMIN — HEPARIN SODIUM 5000 UNITS: 5000 INJECTION, SOLUTION INTRAVENOUS; SUBCUTANEOUS at 11:04

## 2018-04-24 RX ADMIN — Medication 1 CAPSULE: at 08:04

## 2018-04-24 RX ADMIN — INSULIN ASPART 4 UNITS: 100 INJECTION, SOLUTION INTRAVENOUS; SUBCUTANEOUS at 01:04

## 2018-04-24 RX ADMIN — IRBESARTAN 300 MG: 150 TABLET ORAL at 08:04

## 2018-04-24 RX ADMIN — HYPROMELLOSE 2910 2 DROP: 5 SOLUTION OPHTHALMIC at 11:04

## 2018-04-24 RX ADMIN — ASPIRIN 81 MG 81 MG: 81 TABLET ORAL at 08:04

## 2018-04-24 RX ADMIN — HYDRALAZINE HYDROCHLORIDE 100 MG: 25 TABLET, FILM COATED ORAL at 08:04

## 2018-04-24 RX ADMIN — CEFTRIAXONE SODIUM 1 G: 1 INJECTION, POWDER, FOR SOLUTION INTRAMUSCULAR; INTRAVENOUS at 11:04

## 2018-04-24 RX ADMIN — IPRATROPIUM BROMIDE AND ALBUTEROL SULFATE 3 ML: .5; 3 SOLUTION RESPIRATORY (INHALATION) at 01:04

## 2018-04-24 RX ADMIN — HYPROMELLOSE 2910 2 DROP: 5 SOLUTION OPHTHALMIC at 04:04

## 2018-04-24 RX ADMIN — CALCIUM ACETATE 667 MG: 667 CAPSULE ORAL at 04:04

## 2018-04-24 RX ADMIN — FUROSEMIDE 40 MG: 40 TABLET ORAL at 08:04

## 2018-04-24 RX ADMIN — CARVEDILOL 6.25 MG: 6.25 TABLET, FILM COATED ORAL at 08:04

## 2018-04-24 NOTE — PROGRESS NOTES
Progress Note  [facility]  Admit Date: 4/21/2018   LOS: 2 days   SUBJECTIVE:       Patient was awake, alert and oriented x 2. She says she feels much better today and denies any cp, sob, nausea, vomiting, fever, chills and cough. She had another session of HD yesterday.     ROS  Denies N/V/F/C/CP/SOB.   OBJECTIVE:   Vital Signs (Most Recent)  Temp: 98.4 °F (36.9 °C) (04/24/18 0714)  Pulse: 89 (04/24/18 0837)  Resp: 18 (04/24/18 0837)  BP: (!) 140/66 (04/24/18 0714)  SpO2: 97 % (04/24/18 0837)    I & O (Last 24H):    Intake/Output Summary (Last 24 hours) at 04/24/18 0913  Last data filed at 04/24/18 0800   Gross per 24 hour   Intake              580 ml   Output             2400 ml   Net            -1820 ml     Wt Readings from Last 3 Encounters:   04/23/18 53.5 kg (117 lb 15.1 oz)   04/21/18 61.2 kg (135 lb)   02/04/18 58.8 kg (129 lb 10.1 oz)       Current Diet Order   Procedures    Diet Cardiac [facility]; Renal     Order Specific Question:   Indicate patient location for additional diet options:     Answer:   [facility]     Order Specific Question:   Additional Diet Options:     Answer:   Renal        Physical Exam  Constitutional: She is oriented to person, place, and time. No distress.   Appears stated age, elderly, frail   HENT:   Head: Normocephalic and atraumatic.   Mouth/Throat: No oropharyngeal exudate.   Neck: Normal range of motion. Neck supple. No JVD present.   Cardiovascular: Normal rate, regular rhythm, normal heart sounds and intact distal pulses.    No murmur heard.  Left upper arm fistula w/ good thrill   Pulmonary/Chest: Effort normal. No respiratory distress. She has no wheezes. She mild crackles in lower bases.   Abdominal: Soft. Bowel sounds are normal. She exhibits no distension and no mass. There is no tenderness.   Musculoskeletal: Normal range of motion. She exhibits no edema or deformity.   Neurological: She is alert and oriented to person, place, and time. No cranial  nerve deficit.   Skin: Capillary refill takes less than 2 seconds. She is not diaphoretic.   Psychiatric: She has a normal mood and affect. Her behavior is normal.   Nursing note and vitals reviewed.     Laboratory Data:  CBC    Recent Labs  Lab 04/22/18  0505 04/23/18  0810 04/24/18  0309   WBC 9.27 5.70 5.52   RBC 3.47* 3.23* 3.48*   HGB 10.0* 9.4* 10.1*   HCT 32.2* 29.5* 32.2*    230 229   MCV 93 91 93   MCH 28.8 29.1 29.0   MCHC 31.1* 31.9* 31.4*     CMP    Recent Labs  Lab 04/22/18  0505 04/23/18  0810 04/24/18  0309   CALCIUM 9.4 9.6 9.5   PROT 7.5 7.9 8.0   * 132* 133*   K 4.5 4.6 4.6   CO2 25 24 24   CL 91* 89* 95   BUN 46* 68* 31*   CREATININE 5.4* 7.2* 4.0*   ALKPHOS 295* 270* 272*   ALT 21 18 18   AST 31 29 31   BILITOT 0.7 0.7 0.7     POCT-Glucose  POCT Glucose   Date Value Ref Range Status   04/24/2018 216 (H) 70 - 110 mg/dL Final   04/23/2018 297 (H) 70 - 110 mg/dL Final   04/23/2018 162 (H) 70 - 110 mg/dL Final   04/23/2018 174 (H) 70 - 110 mg/dL Final   04/23/2018 227 (H) 70 - 110 mg/dL Final   04/22/2018 315 (H) 70 - 110 mg/dL Final   04/22/2018 344 (H) 70 - 110 mg/dL Final   04/22/2018 311 (H) 70 - 110 mg/dL Final     COAGS  No results for input(s): PT, INR, APTT in the last 168 hours.  UA  No results for input(s): COLORU, CLARITYU, SPECGRAV, PHUR, PROTEINUA, GLUCOSEU, BLOODU, WBCU, RBCU, BACTERIA, MUCUS in the last 24 hours.    Invalid input(s):  BILIRUBINCON  MICRO  Microbiology Results (last 7 days)     ** No results found for the last 168 hours. **        LIPID PANEL  Lab Results   Component Value Date    CHOL 102 (L) 04/22/2018     Lab Results   Component Value Date    HDL 45 04/22/2018     Lab Results   Component Value Date    LDLCALC 44.0 (L) 04/22/2018     Lab Results   Component Value Date    TRIG 65 04/22/2018     Lab Results   Component Value Date    CHOLHDL 44.1 04/22/2018     ASSESSMENT/PLAN:   Albina Garcia is a 86 y.o. female     Hypertensive heart failure with end stage  renal disease on dialysis M/W/F  BNP > 70,000 but with no leg swelling and some wheezing  Last ECHO was 9/17: EF of 55%, DD and mild MR and TR    Avoiding IVF fluids for now as CXR demonstrates likely overload  Consult Nephrology- Beth  BP stable, albeit slightly high On irbesartan 200 mg daily, hydarlazine 100 mg po TID, Coreg 6.25 BID, Lipitor  Volume overload, got dialyzed yesterday  CXR this morning shows improving pulmonary edema    NSTEMI:   Trop in ED 0.547 > 1.199 >1.94  No chest pain   EKG with no ST changes, but + for flipped T waves in inferior leads  Cardiology consulted, will appreciate further recs  Already on ASA, plavix, statin  Echo ordered     Acute cystitis without hematuria  Urine 2+ leuks, >100 WBCs, with many bacteria, nitrite neg  Urine culture pending, with susceptibilities  Avoiding nitrofurantin and bactrim due to geriatric population and renal disease  Giving 5 days of Rocephin for additional respiratory coverage    GERD:   Continue PPI. Stable     URI:   Sputum color change from white to yellow, non-smoker  In nursing home, so potentially would need to cover for HAP  Xray demonstrates bilat infiltrates vs edema  Given dose of levo x 1 in ED.  On rocephin x 5 days for UTI + possible PNA (was taking Z-armani)    ESRD:   MWF Dialysis  Completed full dialysis Friday  Dialysis on Sunday and Monday.   Will appreciate nephro recs  Daily labs      PPx: Heparin    Case discussed with staff    4/24/2018 Beny Faria MD  8:46 AM

## 2018-04-24 NOTE — PROGRESS NOTES
Progress Note  Nephrology      Consult Requested By: William Johnson III, MD      SUBJECTIVE:     Overnight events  Patient is a 86 y.o. female     Patient Active Problem List   Diagnosis    Metabolic bone disease    Hypertensive heart failure with end stage renal disease on dialysis    Anemia in ESRD (end-stage renal disease)    Chronic diastolic congestive heart failure    Type II diabetes mellitus with end-stage renal disease    Diabetic autonomic neuropathy associated with type 2 diabetes mellitus    Stenosis of arteriovenous dialysis fistula    ESRD (end stage renal disease) on dialysis    HTN (hypertension)    Coronary artery disease involving native coronary artery    Upper respiratory infection    Chest pain    NSTEMI (non-ST elevated myocardial infarction)    GERD (gastroesophageal reflux disease)    Abnormal serum lipase level    Hypophosphatemia    Elevated troponin    Elevated troponin level    Acute cystitis without hematuria    Palliative care encounter    Goals of care, counseling/discussion    Counseling regarding advanced care planning and goals of care     Past Medical History:   Diagnosis Date    Anemia in CKD (chronic kidney disease) 10/14/2013    Arthritis     Back pain, chronic     Blood transfusion     CHF (congestive heart failure)     Diabetes mellitus     Elevated cholesterol     ESRD on hemodialysis     esrd    HTN (hypertension) 10/14/2013    Metabolic bone disease 10/14/2013              OBJECTIVE:     Vitals:    04/24/18 0837 04/24/18 0840 04/24/18 1139 04/24/18 1342   BP:   132/62    BP Location:       Patient Position:       Pulse: 89 83 78 79   Resp: 18  16 18   Temp:   97.5 °F (36.4 °C)    TempSrc:       SpO2: 97%   100%   Weight:       Height:           Temp: 97.5 °F (36.4 °C) (04/24/18 1139)  Pulse: 79 (04/24/18 1342)  Resp: 18 (04/24/18 1342)  BP: 132/62 (04/24/18 1139)  SpO2: 100 % (04/24/18 1342)      Date 04/24/18 0700 - 04/25/18 0659   Shift  1209-4216 4956-6362 7128-9005 24 Hour Total   I  N  T  A  K  E   P.O. 360   360    Shift Total  (mL/kg) 360  (6.7)   360  (6.7)   O  U  T  P  U  T   Shift Total  (mL/kg)       Weight (kg) 53.5 53.5 53.5 53.5             Medications:   sodium chloride 0.9%   Intravenous Once    sodium chloride 0.9%   Intravenous Once    albuterol-ipratropium 2.5mg-0.5mg/3mL  3 mL Nebulization Q4H    artificial tears  2 drop Both Eyes BID    aspirin  81 mg Oral Daily    atorvastatin  40 mg Oral Nightly    calcium acetate  667 mg Oral TID WM    carvedilol  6.25 mg Oral BID    cefTRIAXone (ROCEPHIN) IVPB  1 g Intravenous Q24H    cinacalcet  30 mg Oral Daily    clopidogrel  75 mg Oral Daily    furosemide  40 mg Oral Daily    heparin (porcine)  5,000 Units Subcutaneous Q8H    hydrALAZINE  100 mg Oral TID    irbesartan  300 mg Oral Daily    lidocaine  2 patch Transdermal Q24H    pantoprazole  40 mg Oral Daily    vitamin renal formula (B-complex-vitamin c-folic acid)  1 capsule Oral Daily             No SOB  No cough  Physical Exam:  General appearance: NAD  Lungs: diminished breath sounds  Heart: pulse 79  Abdomen: soft  Extremities: no edema  Laboratory:  ABG  Labs reviewed  No results found for this or any previous visit (from the past 336 hour(s)).  Recent Results (from the past 336 hour(s))   CBC with Automated Differential    Collection Time: 04/24/18  3:09 AM   Result Value Ref Range    WBC 5.52 3.90 - 12.70 K/uL    Hemoglobin 10.1 (L) 12.0 - 16.0 g/dL    Hematocrit 32.2 (L) 37.0 - 48.5 %    Platelets 229 150 - 350 K/uL   CBC with Automated Differential    Collection Time: 04/23/18  8:10 AM   Result Value Ref Range    WBC 5.70 3.90 - 12.70 K/uL    Hemoglobin 9.4 (L) 12.0 - 16.0 g/dL    Hematocrit 29.5 (L) 37.0 - 48.5 %    Platelets 230 150 - 350 K/uL   CBC with Automated Differential    Collection Time: 04/22/18  5:05 AM   Result Value Ref Range    WBC 9.27 3.90 - 12.70 K/uL    Hemoglobin 10.0 (L) 12.0 - 16.0 g/dL     Hematocrit 32.2 (L) 37.0 - 48.5 %    Platelets 207 150 - 350 K/uL     Urinalysis  No results for input(s): COLORU, CLARITYU, SPECGRAV, PHUR, PROTEINUA, GLUCOSEU, BILIRUBINCON, BLOODU, WBCU, RBCU, BACTERIA, MUCUS, NITRITE, LEUKOCYTESUR, UROBILINOGEN, HYALINECASTS in the last 24 hours.    Diagnostic Results:  X-Ray: Reviewed  US: Reviewed  Echo: Reviewed  ACCESS    ASSESSMENT/PLAN:     ESRD  Hyponatremia  MBD  Anemia  Poor nutrition  /62  Weight daily  Renal diet  HD M-W-F

## 2018-04-24 NOTE — CONSULTS
"[facility] Cardiology Consult - Resident Note    Primary Attending Physician: Dr. LOI Johnson  Primary Team: [facility]   Consultant Attending: Dr. Bautista  Consultant Resident: Fellow Dr. Stringer    Reason for Consult:     NSTEMI, second opinion requested by family    Subjective:      History of Present Illness:  Albina Garcia is a 86 y.o.  female who  has a past medical history of Anemia in CKD (chronic kidney disease) (10/14/2013); Arthritis; Back pain, chronic; Blood transfusion; CHF (congestive heart failure); Diabetes mellitus; Elevated cholesterol; ESRD on hemodialysis; HTN (hypertension) (10/14/2013); and Metabolic bone disease (10/14/2013).. The patient presented to the [facility] on 4/21/2018 with a primary complaint of No chief complaint on file.      Ms. Garcia presents from [facility] after complaints of chest pain. She tells me that she had previously had felt "short winded" for about 2 weeks and then began to have chest pain while at her nursing home. Her chest pain was described as tightness and did not radiated. It lasted about an hour. She was given sublingual NTG at nursing home which she felt made it worse. Upon arrival here her troponin was 0.547. She appeared clinically volume overloaded and her NT pro-BNP was noted to be >70,000 with BNP 4,775.   She was dialyzed yesterday with about 2 L fluid removed. She feels better after HD and is no longer having chest pain or SOB. However, her troponin climbed to 4.059 on 4/23, now down to 2.851.    Upon discussing this with her, she tells me that she has had chest pain in the past and Dr. Castano put stents in and she had not had much chest pain since. She does not feel that her chest pain a few days ago is similar to last years. She says the previous cardiologist who saw her here suggested medical management which she is ok with, but her daughter wanted a second opinion. When discussing medical management versus " cath she indicates that she would prefer not to have another procedure if not necessary. She is oriented x 3 and understands the risks of both options.     Denies current chest pain, SOB, abdominal pain, dysuria. Endorses some weakness and difficulty getting around (but usually can walk with a walker at the NH).     Past Medical History:  Past Medical History:   Diagnosis Date    Anemia in CKD (chronic kidney disease) 10/14/2013    Arthritis     Back pain, chronic     Blood transfusion     CHF (congestive heart failure)     Diabetes mellitus     Elevated cholesterol     ESRD on hemodialysis     esrd    HTN (hypertension) 10/14/2013    Metabolic bone disease 10/14/2013       Past Surgical History:  Past Surgical History:   Procedure Laterality Date    SPINAL FUSION      TOTAL ABDOMINAL HYSTERECTOMY      TOTAL KNEE ARTHROPLASTY Left     left       Allergies:  Review of patient's allergies indicates:   Allergen Reactions    Penicillins        Medications:   In-Hospital Scheduled Medications:   sodium chloride 0.9%   Intravenous Once    sodium chloride 0.9%   Intravenous Once    albuterol-ipratropium 2.5mg-0.5mg/3mL  3 mL Nebulization Q4H    artificial tears  2 drop Both Eyes BID    aspirin  81 mg Oral Daily    atorvastatin  40 mg Oral Nightly    calcium acetate  667 mg Oral TID WM    carvedilol  6.25 mg Oral BID    cefTRIAXone (ROCEPHIN) IVPB  1 g Intravenous Q24H    cinacalcet  30 mg Oral Daily    clopidogrel  75 mg Oral Daily    furosemide  40 mg Oral Daily    heparin (porcine)  5,000 Units Subcutaneous Q8H    hydrALAZINE  100 mg Oral TID    irbesartan  300 mg Oral Daily    lidocaine  2 patch Transdermal Q24H    pantoprazole  40 mg Oral Daily    vitamin renal formula (B-complex-vitamin c-folic acid)  1 capsule Oral Daily      In-Hospital PRN Medications:  sodium chloride 0.9%, sodium chloride 0.9%, acetaminophen, artificial tears, dextromethorphan-guaifenesin  mg/5 ml, dextrose  50%, dextrose 50%, diphenhydrAMINE, glucagon (human recombinant), glucose, glucose, insulin aspart U-100, nitroGLYCERIN, ondansetron, sodium chloride 0.9%   In-Hospital IV Infusion Medications:     Home Medications:  Prior to Admission medications    Medication Sig Start Date End Date Taking? Authorizing Provider   acetaminophen (TYLENOL) 325 MG tablet Take 2 tablets (650 mg total) by mouth every 8 (eight) hours as needed for Pain or Temperature greater than (100.4 F). 9/4/17   Pallavi Sunkara, MD   aspirin 81 MG Chew Take 1 tablet (81 mg total) by mouth once daily. 1/13/17 1/13/18  Pallavi Sunkara, MD   atorvastatin (LIPITOR) 40 MG tablet Take 1 tablet (40 mg total) by mouth once daily.  Patient taking differently: Take 40 mg by mouth nightly.  5/19/17 5/19/18  Peyton Brenner MD   BD INSULIN SYRINGE ULTRA-FINE 0.5 mL 31 gauge x 5/16 Syrg USE TO INJECT INSULIN 2-3 TIMES A DAY 8/29/16   Historical Provider, MD   calcium acetate (PHOSLO) 667 mg capsule TAKE 2 CAPSULES BY MOUTH 3 TIMES A DAY WITH MEALS 2/3/17   Lior Torres MD   carvedilol (COREG) 6.25 MG tablet Take 1 tablet (6.25 mg total) by mouth 2 (two) times daily. 5/19/17   Peyton Brenner MD   clopidogrel (PLAVIX) 75 mg tablet Take 1 tablet (75 mg total) by mouth once daily. 5/19/17 5/19/18  Peyton Brenner MD   dextromethorphan-guaifenesin  mg Tab Take 1 tablet by mouth every 8 (eight) hours as needed.    Historical Provider, MD   DICYCLOMINE HCL (GI COCKTAIL SIMPLE RECORD) GI Cocktail (Mylanta 30 mL, 2% viscous lidocaine 10 mL, dicyclomine 10 mL) 2/5/18   Peyton Brenner MD   hydrALAZINE (APRESOLINE) 100 MG tablet Take 1 tablet (100 mg total) by mouth 3 (three) times daily. 11/7/16   Armani Devries MD   insulin aspart (NOVOLOG) 100 unit/mL InPn pen Inject 0-5 Units into the skin before meals and at bedtime as needed (Hyperglycemia). 9/4/17 9/4/18  Pallavi Sunkara, MD   irbesartan (AVAPRO) 300 MG tablet Take 300 mg by mouth once daily.  16   Historical Provider, MD   loperamide (IMODIUM A-D) 2 mg Tab Take 2 mg by mouth 4 (four) times daily as needed.    Historical Provider, MD   meclizine (ANTIVERT) 25 mg tablet Take 1 tablet (25 mg total) by mouth 3 (three) times daily as needed for Dizziness. 17   Peyton Brenner MD   nitroGLYCERIN (NITROSTAT) 0.4 MG SL tablet Place 1 tablet (0.4 mg total) under the tongue every 5 (five) minutes as needed for Chest pain (maximum of 3 tablets in 15 minutes.). 17  Peyton Brenner MD   omeprazole (PRILOSEC) 40 MG capsule Take 40 mg by mouth once daily. 16   Historical Provider, MD   polyvinyl alcohol, artificial tears, (LIQUIFILM TEARS) 1.4 % ophthalmic solution Place 1 drop into both eyes as needed.    Historical Provider, MD   propylene glycol (SYSTANE BALANCE) 0.6 % Drop Place 1 drop into both eyes 2 (two) times daily.    Historical Provider, MD   ranitidine (ZANTAC) 300 MG tablet Take 1 tablet (300 mg total) by mouth every evening. 3/15/18 3/15/19  WENDY Nair   SENSIPAR 30 mg Tab TAKE 1 TABLET EVERY DAY 10/19/16   Lior Torres MD   vitamin renal formula, B-complex-vitamin c-folic acid, (NEPHROCAP) 1 mg Cap Take 1 capsule by mouth once daily. 17   Peyton Brenner MD   white petrolatum-mineral oil 57.3-42.5% (REFRESH P.M.) 57.3-42.5 % Oint every evening.    Historical Provider, MD       Family History:  Family History   Problem Relation Age of Onset    Kidney disease Mother     Diabetes Sister        Social History:  Social History   Substance Use Topics    Smoking status: Former Smoker    Smokeless tobacco: Never Used    Alcohol use No       Review of Systems:  All other systems are reviewed and are negative except as noted in the HPI.         Objective:   Last 24 Hour Vital Signs:  BP  Min: 106/52  Max: 163/72  Temp  Av.8 °F (36.6 °C)  Min: 96 °F (35.6 °C)  Max: 98.9 °F (37.2 °C)  Pulse  Av.2  Min: 62  Max: 90  Resp  Av.8  Min: 16  Max:  "18  SpO2  Av.4 %  Min: 97 %  Max: 99 %  Height  Av' 5" (165.1 cm)  Min: 5' 5" (165.1 cm)  Max: 5' 5" (165.1 cm)  Weight  Av.5 kg (117 lb 15.1 oz)  Min: 53.5 kg (117 lb 15.1 oz)  Max: 53.5 kg (117 lb 15.1 oz)  I/O last 3 completed shifts:  In: 400 [Other:400]  Out: 2400 [Other:2400]    Physical Examination:  General: frail, weak and having trouble changing position in bed, alert, awake  HEENT: normocephalic, atraumatic, PERRL, EOMI, good dentition, MMM  CV: RRR, no murmurs or rubs appreciated, normal S1 and S2  Lungs: CTAB. No wheezing, crackles, rales, or rhonchi. Respirations even and non labored on room air  GI: soft, non tender, non distended, normoactive bowel sounds  Extrem: no edema noted, left foot with PIV, right arm fistula with strong thrill  Skin: dry, warm, intact. No bruising or breakdown  Neuro: AAOx3, answers appropriately, moves all extremities but strength LE 3/5, sensation equal and symmetric     Laboratory Results:  Most Recent Data:  CBC: Lab Results   Component Value Date    WBC 5.52 2018    HGB 10.1 (L) 2018    HCT 32.2 (L) 2018     2018    MCV 93 2018    RDW 16.9 (H) 2018     BMP: Lab Results   Component Value Date     (L) 2018    K 4.6 2018    CL 95 2018    CO2 24 2018    BUN 31 (H) 2018     (H) 2018    CALCIUM 9.5 2018    MG 2.3 2018    PHOS 2.7 2018     LFTs: Lab Results   Component Value Date    PROT 8.0 2018    ALBUMIN 3.4 (L) 2018    BILITOT 0.7 2018    AST 31 2018    ALKPHOS 272 (H) 2018    ALT 18 2018     Coags:   Lab Results   Component Value Date    INR 1.1 2017     FLP: Lab Results   Component Value Date    CHOL 102 (L) 2018    HDL 45 2018    LDLCALC 44.0 (L) 2018    TRIG 65 2018    CHOLHDL 44.1 2018     DM: Lab Results   Component Value Date    HGBA1C 7.0 (H) 2018    HGBA1C 6.3 (H) " 01/09/2018    HGBA1C 5.5 09/04/2017    LDLCALC 44.0 (L) 04/22/2018    CREATININE 4.0 (H) 04/24/2018     Thyroid: Lab Results   Component Value Date    TSH 1.950 09/02/2017     Anemia: Lab Results   Component Value Date    IRON 46 08/23/2016    TIBC 178 (L) 08/23/2016    FERRITIN 2,017 (H) 08/23/2016    VGGSNQGE00 746 08/23/2016    FOLATE 10.8 08/23/2016     Cardiac: Lab Results   Component Value Date    TROPONINI 2.851 (H) 04/24/2018    CKTOTAL 65 02/02/2016    CKMB 0.78 02/02/2016    BNP 4,775 (H) 04/23/2018     Urinalysis: Lab Results   Component Value Date    LABURIN  09/02/2017     AEROCOCCUS URINAE  > 100,000 cfu/ml  Susceptibility testing not routinely performed      COLORU Yellow 04/21/2018    SPECGRAV 1.025 04/21/2018    NITRITE Negative 04/21/2018    KETONESU Negative 04/21/2018    UROBILINOGEN Negative 04/21/2018       Trended Lab Data:    Recent Labs  Lab 04/22/18  0505 04/23/18  0810 04/24/18  0309   WBC 9.27 5.70 5.52   HGB 10.0* 9.4* 10.1*   HCT 32.2* 29.5* 32.2*    230 229   MCV 93 91 93   RDW 17.2* 17.0* 16.9*   * 132* 133*   K 4.5 4.6 4.6   CL 91* 89* 95   CO2 25 24 24   BUN 46* 68* 31*   * 245* 184*   PROT 7.5 7.9 8.0   ALBUMIN 3.5 3.5 3.4*   BILITOT 0.7 0.7 0.7   AST 31 29 31   ALKPHOS 295* 270* 272*   ALT 21 18 18       Trended Cardiac Data:    Recent Labs  Lab 04/22/18  0505 04/23/18  0810 04/24/18  1041   TROPONINI 1.942* 4.059* 2.851*   BNP  --  4,775*  --          Other Results:  EKG (my interpretation): EKG 4/22 with st depressions in leads II, III, aVF    Radiology:  X-ray Chest 1 View    Result Date: 4/24/2018  EXAMINATION: XR CHEST 1 VIEW CLINICAL HISTORY: edema; End stage renal disease TECHNIQUE: Single frontal view of the chest was performed. COMPARISON: 04/21/2018 FINDINGS: Cardiomediastinal silhouette is stable.  There is aortic and coronary atherosclerosis.  Lungs are symmetrically expanded without evidence of large pleural effusion, pneumothorax or significant  focal consolidation.  Mild blunting of left costophrenic angle.  Significant interval improvement in the interstitial opacities when compared to prior exam.  Bones demonstrate no acute abnormality.     Interval improvement when compared to prior examination suggesting improving pulmonary edema.  No significant detrimental change. Electronically signed by: Brodie Moss MD Date:    04/24/2018 Time:    08:07    X-ray Chest 1 View    Result Date: 4/22/2018  EXAMINATION: XR CHEST 1 VIEW CLINICAL HISTORY: Cough TECHNIQUE: Single frontal view of the chest was performed. FINDINGS: There are bilateral perihilar and basilar opacities with interstitial prominence consistent with edema versus infection.  There is no pneumothorax.  There is probable left-sided pleural fluid.  The cardiac silhouette appears enlarged.  There is calcification of the aorta.  The osseous structures demonstrate degenerative change.     As above. Electronically signed by: Ramon Carr MD Date:    04/22/2018 Time:    09:28    X-ray Lumbar Spine Ap And Lateral    Result Date: 4/24/2018  EXAMINATION: XR LUMBAR SPINE AP AND LATERAL CLINICAL HISTORY: Low back pain, risk factors (osteoporosis or chronic steroid use or elderly);Low back pain TECHNIQUE: AP, lateral and spot images were performed of the lumbar spine. COMPARISON: 05/09/2016 FINDINGS: Postoperative changes with pedicle screws extending from L4 through S1 with transitional anatomy at the lumbosacral junction.  There is stable anterolisthesis of L5 on S1 and retrolisthesis of L3 on L4.  Hardware appears to be intact.  There is advanced multilevel degenerative change.  Sagittal alignment is stable when compared to prior exam.  No evidence of new compression fracture.  Indistinct cortical margins along the endplates of L3-4 which appears stable.  Given the stability from 2016, this is likely degenerative and argues against infectious process.  There is no new compression fracture.  There is extensive  aortic atherosclerosis.  Postoperative changes in the right upper quadrant.     No significant detrimental change when compared to prior exam.  Postoperative change with advanced multilevel degenerative change of the lumbar spine as detailed above. Electronically signed by: Brodie Moss MD Date:    04/24/2018 Time:    08:15         Assessment:     Albina Garcia is a 86 y.o. female with:  Patient Active Problem List    Diagnosis Date Noted    Palliative care encounter 04/24/2018    Goals of care, counseling/discussion 04/24/2018    Counseling regarding advanced care planning and goals of care 04/24/2018    Acute cystitis without hematuria 04/22/2018    Elevated troponin level 04/21/2018    Abnormal serum lipase level 02/05/2018    Hypophosphatemia 02/05/2018    Elevated troponin 02/05/2018    Chest pain 09/14/2017    NSTEMI (non-ST elevated myocardial infarction) 09/14/2017    GERD (gastroesophageal reflux disease) 09/14/2017    Upper respiratory infection 09/04/2017    Coronary artery disease involving native coronary artery 05/18/2017    ESRD (end stage renal disease) on dialysis 01/11/2017    Stenosis of arteriovenous dialysis fistula 10/18/2016    Diabetic autonomic neuropathy associated with type 2 diabetes mellitus     Chronic diastolic congestive heart failure 05/18/2015    Type II diabetes mellitus with end-stage renal disease 05/18/2015    Metabolic bone disease 10/14/2013    Hypertensive heart failure with end stage renal disease on dialysis 10/14/2013    Anemia in ESRD (end-stage renal disease) 10/14/2013    HTN (hypertension) 10/14/2013        Plan:     NSTEMI in setting of CAD  - troponin 0.5 on arrival, up to 4.059 and now at 2.8  - prior PCI 9/2017 with MATTHEW x 1 to D2, mRCA with 90% calcified lesion s/p balloon angioplasty x 2 at 18atm without improvement and 90% residual stenosis with ALBERTO 3 flow; could not advance guidewire for PCI per op report (media section)  - 9/2017 with  LVEDP 18 and EF 55%  - new echo 4/23 with EF 35-40%, diastolic dysfunction  - home meds include asa 81mg, plavix 75mg, lipitor 40mg, coreg 6.25mg BID, hydralazine 100mg TID, irbesartan 300mg  - EKG with ST depressions in II, III, aVF  - [facility] cardiology recommended medical management, patient's daughter requested second opinion. Patient currently would prefer medical management to procedure  - will discuss with staff, Dr. Bautista     Acute on Chronic Diastolic Heart Failure  - presented with volume overload, BNP 4K  - new echo with EF 35-40% (previous 55) and diastolic dysfunction  - volume overloaded on presentation, received HD with output 2L  - now without crackles or edema, patient no longer SOB  - likely secondary to cardiac event   - continue ARB, BB, HD  - receiving lasix 40mg PO daily here, not a home med. Likely not needed upon discharge since she gets dialysis     HTN  - continue ARB, BB, hydralazine  - BP today stable at 132/62  - close monitoring     DM2  - home meds with insulin novolog before meals   - A1C 7.0  - SSI, accuchecks    CKD V on HD  - received HD yesterday with -2L  - volume status appears euvolemic  - keep HD schedule as is     URI, acute cystitis  - manage per primary team  - improving    GERD  - on home ppi, continue       Thank you for allowing us to participate in the care of this patient. Please contact me at [phone] if you have any questions regarding this consult.    Sharyn Shah  [facility] HO-[id number]  [facility] service   [phone]

## 2018-04-24 NOTE — PT/OT/SLP EVAL
Occupational Therapy   Evaluation & tx    Name: Albina Garcia  MRN: [id number]  Admitting Diagnosis:  NSTEMI (non-ST elevated myocardial infarction)      Recommendations:     Discharge Recommendations: nursing facility, basic (w/ part B OT svcs)  Discharge Equipment Recommendations:   (defer to SNF)  Barriers to discharge:  None    History:     Occupational Profile:  Living Environment: NH  Previous level of function: per pt report, Mod (I) via rollator vs WC w/ all fxnl tasks except req's A w/ bathing  Roles and Routines: .  Equipment Owned:  wheelchair, rollator, grab bar  Assistance upon Discharge: 24hr S/A    Past Medical History:   Diagnosis Date    Anemia in CKD (chronic kidney disease) 10/14/2013    Arthritis     Back pain, chronic     Blood transfusion     CHF (congestive heart failure)     Diabetes mellitus     Elevated cholesterol     ESRD on hemodialysis     esrd    HTN (hypertension) 10/14/2013    Metabolic bone disease 10/14/2013       Past Surgical History:   Procedure Laterality Date    SPINAL FUSION      TOTAL ABDOMINAL HYSTERECTOMY      TOTAL KNEE ARTHROPLASTY Left     left       Subjective     Chief Complaint: getting weak in the bed  Patient/Family stated goals: return to PLOF  Communicated with: setphon prior to session.  Pain/Comfort:  · Pain Rating 1: 0/10  · Pain Rating Post-Intervention 1: 0/10    Patients cultural, spiritual, Catholic conflicts given the current situation:      Objective:     Patient found with: telemetry    General Precautions: Standard, fall   Orthopedic Precautions:    Braces:       Occupational Performance:    Bed Mobility:    · Patient completed Supine to Sit with moderate assistance    Functional Mobility/Transfers:  · Patient completed Sit <> Stand Transfer with minimum assistance  with  rolling walker   · Patient completed Bed <> Chair Transfer using Step Transfer technique with minimum assistance and moderate assistance with rolling walker  · Functional  "Mobility: short dist via RW w/ Min A for DME manip    Activities of Daily Living:  · LB Dressing: moderate assistance doff socks at b/s chair level    Cognitive/Visual Perceptual:  Grossly WFL    Physical Exam:  B shldrs ~140* at 4-/5  **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 6 Click:  [facility] Total Score: 14    Treatment & Education:  OT eval/tx completed this date. Pt lives at NH & reports being Mod (I) w/ most BADLs via rollator vs WC w/ U/LE propulsion. Pt perf the following: sup-->EOB & forward scoot w/ Mod A; standing via RW w/ min A; t/f-->b/s chair via RW w/ Min A & Mod A for controlled descent. Edu/tx re: UB HEP. Pt verbalized/demo'ed understanding.    Education:    Assessment:   Pt presents w/ decreased overall endurance/conditioning, balance/mobility & coordinaiton w/ subsequent decline in (I)/safety w/ BADLs, fxnl mobility & fxnl t/f's. OT 5x/wk to increase phys/fxnl status & maximize potential to achieve established goals for d/c-->NH w/ Part B OT svcs & DME deferred.    Albina Garcia is a 86 y.o. female with a medical diagnosis of NSTEMI (non-ST elevated myocardial infarction).  She presents with ..  Performance deficits affecting function are weakness, impaired endurance, gait instability, impaired functional mobilty, impaired self care skills, impaired balance, decreased coordination, decreased upper extremity function, decreased lower extremity function, decreased safety awareness.      Rehab Prognosis:  good; patient would benefit from acute skilled OT services to address these deficits and reach maximum level of function.         Clinical Decision Makin.  OT Low:  "Pt evaluation falls under low complexity for evaluation coding due to performance deficits noted in 1-3 areas as stated above and 0 co-morbities affecting current functional status. Data obtained from problem " "focused assessments. No modifications or assistance was required for completion of evaluation. Only brief occupational profile and history review completed."     Plan:     Patient to be seen 5 x/week to address the above listed problems via self-care/home management, therapeutic activities, therapeutic exercises  · Plan of Care Expires: 05/24/18  · Plan of Care Reviewed with: patient    This Plan of care has been discussed with the patient who was involved in its development and understands and is in agreement with the identified goals and treatment plan    GOALS:    Occupational Therapy Goals        Problem: Occupational Therapy Goal    Goal Priority Disciplines Outcome Interventions   Occupational Therapy Goal     OT, PT/OT Ongoing (interventions implemented as appropriate)    Description:  Goals to be met by: 05/24/18     Patient will increase functional independence with ADLs by performing:    UE Dressing with Stand-by Assistance.  LE Dressing with Minimal Assistance.  Grooming while standing with Stand-by Assistance.  Toileting from toilet with Contact Guard Assistance for hygiene and clothing management.   Toilet transfer to toilet with Contact Guard Assistance.  Increased functional strength to WFL for ADLs.                      Time Tracking:     OT Date of Treatment: 04/24/18  OT Start Time: 1007  OT Stop Time: 1038  OT Total Time (min): 31 min    Billable Minutes:Evaluation 15  Therapeutic Activity 16  Total Time 31    SHARMIN Danielle  4/24/2018    "

## 2018-04-24 NOTE — PLAN OF CARE
Problem: Occupational Therapy Goal  Goal: Occupational Therapy Goal  Goals to be met by: 05/24/18     Patient will increase functional independence with ADLs by performing:    UE Dressing with Stand-by Assistance.  LE Dressing with SBA.  Grooming while standing with Stand-by Assistance.  Toileting from toilet with Contact Guard Assistance for hygiene and clothing management.   Toilet transfer to toilet with Contact Guard Assistance.  Increased functional strength to WFL for ADLs.    Outcome: Ongoing (interventions implemented as appropriate)  OT eval/tx completed this date. Pt lives at NH & reports being Mod (I) w/ most BADLs via rollator vs WC w/ U/LE propulsion. Pt perf the following: sup-->EOB & forward scoot w/ Mod A; standing via RW w/ min A; t/f-->b/s chair via RW w/ Min A & Mod A for controlled descent. Edu/tx re: UB HEP. Pt verbalized/demo'ed understanding.    Pt presents w/ decreased overall endurance/conditioning, balance/mobility & coordinaiton w/ subsequent decline in (I)/safety w/ BADLs, fxnl mobility & fxnl t/f's. OT 5x/wk to increase phys/fxnl status & maximize potential to achieve established goals for d/c-->NH w/ Part B OT svcs & DME deferred.

## 2018-04-24 NOTE — PLAN OF CARE
Problem: Patient Care Overview  Goal: Plan of Care Review  Outcome: Ongoing (interventions implemented as appropriate)  Plan of care reviewed with patient. Patient verbalized complete understanding. Fall precautions maintained. Bed in lowest position, locked, call light within reach and bed alarm is on. Side rails up x's 2 with slip resistant socks on.Accuchecks performed, PRN insulin given. Patient refused midnight heparin, even after educated on the importance of med.Nurse instructed patient to notify staff for any assistance and the patient verbalized complete understanding. Patient on telemetry throughout shift with no ectopy noted. Will continue to monitor.

## 2018-04-24 NOTE — PLAN OF CARE
"TN spoke with Dr. Torres. He stated patient is not discharging today. TN faxed updated clinicals to [facility].    1138: TN went to meet with patient. She was sitting in the chair at this time. TN reviewed patient's clinicals. She reports feeling "better" today. TN will continue to follow.     1530: TN met with patient's daughter at bedside. She was given resources regarding hospice companies. Daughter wanted more information regarding hospice at the nursing home. TN also provided her with a senior resource guide. TN called [facility] and spoke with Keren. They use [facility], but it is the family preference with who they want to go. TN also informed daughter if they are not ready for hospice now, the nursing home can facilitate when she goes back. TN left message for [facility] [place].     04/24/18 1133   Discharge Reassessment   Assessment Type Discharge Planning Reassessment   Provided patient/caregiver education on the expected discharge date and the discharge plan Yes   Do you have any problems affording any of your prescribed medications? No   Discharge Plan A Return to nursing home   Discharge Plan B Skilled Nursing Facility   Patient choice form signed by patient/caregiver N/A   Describe the patient's ability to walk at the present time. Walks with the help of equipment   How often would a person be available to care for the patient? Whenever needed     Cindy Mast RN  Transition Navigator  [phone]  "

## 2018-04-24 NOTE — PROGRESS NOTES
Palliative Care Daily Progress Note       Palliative care met with Mr. Garcia (Spouse) and daughter (Ms. Nance) via phone conference. Discussions about patient current condition and to answer any questions and concerns family had with patient care and illness. Palliative care education family on patients current medical condition and verbalized understanding. Palliative care informed family that patient would not be a candidate for surgery. Non-invasive treatments like medication would be used to treat patient.     Daughter (Ms. Nance) asked about Hospice care and could she get it in the nursing. Palliative care educated on hospice care in nursing home setting vs inpatient setting. Also that patient would be able to continue dialysis while on hospice care.  Daughter would like to speak with  about hospice companies at the [facility]. [employer] case manager notified that daughter would be at hospital for 3:30pm. Emotional support provided.     Mr. Garcia brought in patient living will. Copy was made and placed in patient's chart.     Family discharge option Nursing Home with hospice care.             Recommendation  Continue Current medical treatment   Code status: Full Code  Family discharge option- To return to [facility] with hospice care.     Palliative Care Team will continue to follow patient to assist family with goals of care.         Thank you for the consult the opportunity to participate in Ms. Garcia's  care.       Time Spent:> 50% of 90 min. in visit spent in chart review, phone discussion of goals of care with family, symptom assessment, coordination of care and emotional support           Adrienne Jerry, MSN, APRN, NP-C  Palliative  Medicine  [phone]

## 2018-04-24 NOTE — PLAN OF CARE
Problem: Physical Therapy Goal  Goal: Physical Therapy Goal  Goals to be met by: 2018     Patient will increase functional independence with mobility by performin. Supine to sit with Stand-by Assistance  2. Sit to stand transfer with Stand-by Assistance  3. Bed to chair transfer with Stand-by Assistance using Rolling Walker  4. Gait  x 50 feet with Stand-by Assistance using Rolling Walker.     Outcome: Ongoing (interventions implemented as appropriate)  Recommend return to Nursing Home with HH Part B  No DME needs noted

## 2018-04-24 NOTE — PT/OT/SLP EVAL
Physical Therapy Evaluation    Patient Name:  Albina Garcia   MRN:  [id number]    Recommendations:     Discharge Recommendations:  nursing facility, basic   Discharge Equipment Recommendations: walker, rolling   Barriers to discharge: None    Assessment:     Albina Garcia is a 86 y.o. female admitted with a medical diagnosis of NSTEMI (non-ST elevated myocardial infarction).  She presents with the following impairments/functional limitations:  weakness, gait instability, impaired balance, impaired cardiopulmonary response to activity, decreased lower extremity function, impaired endurance, impaired self care skills, impaired functional mobilty  Patient up in chair at bedside Able to come from sit <> stand with min/mod assist, Gait with RW ~ 5 ft with min/CG assist Will benefit from therapy service in nursing home initially.    Rehab Prognosis:  Fair; patient would benefit from acute skilled PT services to address these deficits and reach maximum level of function.      Recent Surgery: * No surgery found *      Plan:     During this hospitalization, patient to be seen 6 x/week to address the above listed problems via gait training, therapeutic activities, therapeutic exercises  · Plan of Care Expires:  05/24/18   Plan of Care Reviewed with: patient    Subjective     Communicated with primary nurse prior to session.  Patient found up in chair at bedside upon PT entry to room, agreeable to evaluation.      Chief Complaint: weakness  Patient comments/goals: go home  Pain/Comfort:  · Pain Rating 1: 0/10  · Pain Rating Post-Intervention 1: 0/10    Patients cultural, spiritual, Mandaen conflicts given the current situation:      Living Environment:  Lives in nursing facility currently  Prior to admission, patients level of function was needed assistance.  Patient has the following equipment: wheelchair, rollator.  DME owned (not currently used): none.  Upon discharge, patient will have assistance from family/nursing  staff.    Objective:     Patient found with: telemetry     General Precautions: Standard, fall   Orthopedic Precautions:N/A   Braces: N/A     Exams:  · RLE ROM: WFL  · RLE Strength: 3/5 to -4/5 by observation of transitional mvts  · LLE ROM: WFL  · LLE Strength: 3/5 to -4/5 by ovbservation of transitional mvts    Functional Mobility:  · Transfers:     · Sit to Stand:  minimum assistance and moderate assistance with rolling walker  · Gait: 5 ft with CG assist  · Balance: Fair with RW    AM-PAC 6 CLICK MOBILITY  Total Score:14       Therapeutic Activities and Exercises:   na    Patient left up in chair with all lines intact and call button in reach.    GOALS:    Physical Therapy Goals        Problem: Physical Therapy Goal    Goal Priority Disciplines Outcome Goal Variances Interventions   Physical Therapy Goal     PT/OT, PT Ongoing (interventions implemented as appropriate)     Description:  Goals to be met by: 2018     Patient will increase functional independence with mobility by performin. Supine to sit with Stand-by Assistance  2. Sit to stand transfer with Stand-by Assistance  3. Bed to chair transfer with Stand-by Assistance using Rolling Walker  4. Gait  x 50 feet with Stand-by Assistance using Rolling Walker.                       History:     Past Medical History:   Diagnosis Date    Anemia in CKD (chronic kidney disease) 10/14/2013    Arthritis     Back pain, chronic     Blood transfusion     CHF (congestive heart failure)     Diabetes mellitus     Elevated cholesterol     ESRD on hemodialysis     esrd    HTN (hypertension) 10/14/2013    Metabolic bone disease 10/14/2013       Past Surgical History:   Procedure Laterality Date    SPINAL FUSION      TOTAL ABDOMINAL HYSTERECTOMY      TOTAL KNEE ARTHROPLASTY Left     left       Clinical Decision Making:     History  Co-morbidities and personal factors that may impact the plan of care Examination  Body Structures and Functions, activity  limitations and participation restrictions that may impact the plan of care Clinical Presentation   Decision Making/ Complexity Score   Co-morbidities:   [] Time since onset of injury / illness / exacerbation  [] Status of current condition  []Patient's cognitive status and safety concerns    [x] Multiple Medical Problems (see med hx)  Personal Factors:   [] Patient's age  [x] Prior Level of function   [] Patient's home situation (environment and family support)  [] Patient's level of motivation  [x] Expected progression of patient      HISTORY:(criteria)    [] 80456 - no personal factors/history    [] 88913 - has 1-2 personal factor/comorbidity     [x] 91217 - has >3 personal factor/comorbidity     Body Regions:  [] Objective examination findings  [] Head     []  Neck  [] Trunk   [] Upper Extremity  [x] Lower Extremity    Body Systems:  [] For communication ability, affect, cognition, language, and learning style: the assessment of the ability to make needs known, consciousness, orientation (person, place, and time), expected emotional /behavioral responses, and learning preferences (eg, learning barriers, education  needs)  [x] For the neuromuscular system: a general assessment of gross coordinated movement (eg, balance, gait, locomotion, transfers, and transitions) and motor function  (motor control and motor learning)  [x] For the musculoskeletal system: the assessment of gross symmetry, gross range of motion, gross strength, height, and weight  [] For the integumentary system: the assessment of pliability(texture), presence of scar formation, skin color, and skin integrity  [] For cardiovascular/pulmonary system: the assessment of heart rate, respiratory rate, blood pressure, and edema     Activity limitations:    [] Patient's cognitive status and saf ety concerns          [] Status of current condition      [] Weight bearing restriction  [x] Cardiopulmunary Restriction    Participation Restrictions:   [] Goals  and goal agreement with the patient     [] Rehab potential (prognosis) and probable outcome      Examination of Body System: (criteria)    [] 63275 - addressing 1-2 elements    [x] 07894 - addressing a total of 3 or more elements     [] 10628 -  Addressing a total of 4 or more elements         Clinical Presentation: (criteria)  Stable - 41744     On examination of body system using standardized tests and measures patient presents with 3 or more elements from any of the following: body structures and functions, activity limitations, and/or participation restrictions.  Leading to a clinical presentation that is considered stable and/or uncomplicated                              Clinical Decision Making  (Eval Complexity):  Low- 56121     Time Tracking:     PT Received On: 04/24/18  PT Start Time: 1055     PT Stop Time: 1114  PT Total Time (min): 19 min     Billable Minutes: Evaluation 19      Donaldo Xiong, PT  04/24/2018

## 2018-04-25 VITALS
SYSTOLIC BLOOD PRESSURE: 129 MMHG | HEART RATE: 89 BPM | HEIGHT: 65 IN | BODY MASS INDEX: 19.65 KG/M2 | WEIGHT: 117.94 LBS | TEMPERATURE: 98 F | OXYGEN SATURATION: 98 % | RESPIRATION RATE: 16 BRPM | DIASTOLIC BLOOD PRESSURE: 62 MMHG

## 2018-04-25 PROBLEM — Z95.5 STENTED CORONARY ARTERY: Status: ACTIVE | Noted: 2018-04-25

## 2018-04-25 LAB
ALBUMIN SERPL BCP-MCNC: 3.3 G/DL
ALP SERPL-CCNC: 248 U/L
ALT SERPL W/O P-5'-P-CCNC: 13 U/L
ANION GAP SERPL CALC-SCNC: 18 MMOL/L
AST SERPL-CCNC: 22 U/L
BASOPHILS # BLD AUTO: 0.03 K/UL
BASOPHILS NFR BLD: 0.6 %
BILIRUB SERPL-MCNC: 0.4 MG/DL
BUN SERPL-MCNC: 51 MG/DL
CALCIUM SERPL-MCNC: 8.6 MG/DL
CHLORIDE SERPL-SCNC: 91 MMOL/L
CO2 SERPL-SCNC: 21 MMOL/L
CREAT SERPL-MCNC: 5.9 MG/DL
DIFFERENTIAL METHOD: ABNORMAL
EOSINOPHIL # BLD AUTO: 0.2 K/UL
EOSINOPHIL NFR BLD: 4.1 %
ERYTHROCYTE [DISTWIDTH] IN BLOOD BY AUTOMATED COUNT: 16.4 %
EST. GFR  (AFRICAN AMERICAN): 7 ML/MIN/1.73 M^2
EST. GFR  (NON AFRICAN AMERICAN): 6 ML/MIN/1.73 M^2
GLUCOSE SERPL-MCNC: 280 MG/DL
HCT VFR BLD AUTO: 30.9 %
HGB BLD-MCNC: 9.6 G/DL
LYMPHOCYTES # BLD AUTO: 1.3 K/UL
LYMPHOCYTES NFR BLD: 26.9 %
MAGNESIUM SERPL-MCNC: 2 MG/DL
MCH RBC QN AUTO: 28.5 PG
MCHC RBC AUTO-ENTMCNC: 31.1 G/DL
MCV RBC AUTO: 92 FL
MONOCYTES # BLD AUTO: 0.8 K/UL
MONOCYTES NFR BLD: 17.1 %
NEUTROPHILS # BLD AUTO: 2.5 K/UL
NEUTROPHILS NFR BLD: 51.1 %
PHOSPHATE SERPL-MCNC: 3 MG/DL
PLATELET # BLD AUTO: 212 K/UL
PMV BLD AUTO: 11.7 FL
POCT GLUCOSE: 260 MG/DL (ref 70–110)
POTASSIUM SERPL-SCNC: 4 MMOL/L
PROT SERPL-MCNC: 7.4 G/DL
RBC # BLD AUTO: 3.37 M/UL
SODIUM SERPL-SCNC: 130 MMOL/L
WBC # BLD AUTO: 4.9 K/UL

## 2018-04-25 PROCEDURE — 94645 CONT INHLJ TX EACH ADDL HOUR: CPT

## 2018-04-25 PROCEDURE — 36415 COLL VENOUS BLD VENIPUNCTURE: CPT

## 2018-04-25 PROCEDURE — 25000003 PHARM REV CODE 250: Performed by: INTERNAL MEDICINE

## 2018-04-25 PROCEDURE — 83735 ASSAY OF MAGNESIUM: CPT

## 2018-04-25 PROCEDURE — 85025 COMPLETE CBC W/AUTO DIFF WBC: CPT

## 2018-04-25 PROCEDURE — 99900035 HC TECH TIME PER 15 MIN (STAT)

## 2018-04-25 PROCEDURE — 80053 COMPREHEN METABOLIC PANEL: CPT

## 2018-04-25 PROCEDURE — 63600175 PHARM REV CODE 636 W HCPCS: Performed by: INTERNAL MEDICINE

## 2018-04-25 PROCEDURE — 25000242 PHARM REV CODE 250 ALT 637 W/ HCPCS: Performed by: FAMILY MEDICINE

## 2018-04-25 PROCEDURE — 94664 DEMO&/EVAL PT USE INHALER: CPT

## 2018-04-25 PROCEDURE — 25000003 PHARM REV CODE 250: Performed by: STUDENT IN AN ORGANIZED HEALTH CARE EDUCATION/TRAINING PROGRAM

## 2018-04-25 PROCEDURE — 84100 ASSAY OF PHOSPHORUS: CPT

## 2018-04-25 PROCEDURE — 80100016 HC MAINTENANCE HEMODIALYSIS

## 2018-04-25 PROCEDURE — 94640 AIRWAY INHALATION TREATMENT: CPT

## 2018-04-25 PROCEDURE — 25000003 PHARM REV CODE 250: Performed by: FAMILY MEDICINE

## 2018-04-25 RX ORDER — CIPROFLOXACIN 500 MG/1
250 TABLET ORAL EVERY 12 HOURS
Qty: 3 TABLET | Refills: 0 | Status: SHIPPED | OUTPATIENT
Start: 2018-04-25 | End: 2018-04-25 | Stop reason: HOSPADM

## 2018-04-25 RX ORDER — LIDOCAINE 50 MG/G
2 PATCH TOPICAL
Status: DISCONTINUED | OUTPATIENT
Start: 2018-04-25 | End: 2018-04-25 | Stop reason: HOSPADM

## 2018-04-25 RX ORDER — INSULIN ASPART 100 [IU]/ML
0-5 INJECTION, SOLUTION INTRAVENOUS; SUBCUTANEOUS
Status: DISCONTINUED | OUTPATIENT
Start: 2018-04-25 | End: 2018-04-25 | Stop reason: HOSPADM

## 2018-04-25 RX ORDER — FUROSEMIDE 40 MG/1
80 TABLET ORAL DAILY
Status: DISCONTINUED | OUTPATIENT
Start: 2018-04-26 | End: 2018-04-25 | Stop reason: HOSPADM

## 2018-04-25 RX ORDER — IBUPROFEN 200 MG
24 TABLET ORAL
Status: DISCONTINUED | OUTPATIENT
Start: 2018-04-25 | End: 2018-04-25 | Stop reason: HOSPADM

## 2018-04-25 RX ORDER — IBUPROFEN 200 MG
16 TABLET ORAL
Status: DISCONTINUED | OUTPATIENT
Start: 2018-04-25 | End: 2018-04-25 | Stop reason: HOSPADM

## 2018-04-25 RX ORDER — GLUCAGON 1 MG
1 KIT INJECTION
Status: DISCONTINUED | OUTPATIENT
Start: 2018-04-25 | End: 2018-04-25 | Stop reason: HOSPADM

## 2018-04-25 RX ADMIN — ASPIRIN 81 MG 81 MG: 81 TABLET ORAL at 01:04

## 2018-04-25 RX ADMIN — IPRATROPIUM BROMIDE AND ALBUTEROL SULFATE 3 ML: .5; 3 SOLUTION RESPIRATORY (INHALATION) at 04:04

## 2018-04-25 RX ADMIN — IRBESARTAN 300 MG: 150 TABLET ORAL at 01:04

## 2018-04-25 RX ADMIN — HYPROMELLOSE 2910 2 DROP: 5 SOLUTION OPHTHALMIC at 01:04

## 2018-04-25 RX ADMIN — Medication 1 CAPSULE: at 01:04

## 2018-04-25 RX ADMIN — CLOPIDOGREL BISULFATE 75 MG: 75 TABLET ORAL at 01:04

## 2018-04-25 RX ADMIN — ERYTHROPOIETIN 3000 UNITS: 3000 INJECTION, SOLUTION INTRAVENOUS; SUBCUTANEOUS at 12:04

## 2018-04-25 RX ADMIN — LIDOCAINE 2 PATCH: 50 PATCH TOPICAL at 07:04

## 2018-04-25 RX ADMIN — HYPROMELLOSE 2910 2 DROP: 5 SOLUTION OPHTHALMIC at 06:04

## 2018-04-25 RX ADMIN — IPRATROPIUM BROMIDE AND ALBUTEROL SULFATE 3 ML: .5; 3 SOLUTION RESPIRATORY (INHALATION) at 12:04

## 2018-04-25 RX ADMIN — SODIUM CHLORIDE: 0.9 INJECTION, SOLUTION INTRAVENOUS at 09:04

## 2018-04-25 RX ADMIN — CINACALCET HYDROCHLORIDE 30 MG: 30 TABLET, COATED ORAL at 01:04

## 2018-04-25 RX ADMIN — PANTOPRAZOLE SODIUM 40 MG: 40 TABLET, DELAYED RELEASE ORAL at 01:04

## 2018-04-25 RX ADMIN — ACETAMINOPHEN 650 MG: 325 TABLET ORAL at 06:04

## 2018-04-25 RX ADMIN — IPRATROPIUM BROMIDE AND ALBUTEROL SULFATE 3 ML: .5; 3 SOLUTION RESPIRATORY (INHALATION) at 07:04

## 2018-04-25 NOTE — PLAN OF CARE
04/25/18 1110   Medicare Message   Important Message from Medicare regarding Discharge Appeal Rights Given to patient/caregiver;Explained to patient/caregiver;Signed/date by patient/caregiver  ( left vm for pt's daughter Sina Nance)   Date IMM was signed 04/25/18  ( left vm for pt's daughter Sina MAGANA)   Time IMM was signed 1110

## 2018-04-25 NOTE — PT/OT/SLP PROGRESS
Occupational Therapy      Patient Name:  Albina Garcia   MRN:  [id number]    Patient not seen today secondary to  Pt in dialysis. Will follow-up at later time.    LEXX Kauffman  4/25/2018

## 2018-04-25 NOTE — PLAN OF CARE
[facility]    FACILITY TRANSFER ORDERS      Patient Name: Albina Garcia  YOB: 1932    PCP: Judie Dsouza MD   PCP Address: [address] [facility] / [place]  PCP Phone Number: [phone]  PCP Fax: [phone]    Encounter Date: 04/25/2018    Admit to: [facility] with Part B    Vital Signs:  Routine    Diagnoses:   Active Hospital Problems    Diagnosis  POA    *NSTEMI (non-ST elevated myocardial infarction) [I21.4]  Yes    Palliative care encounter [Z51.5]  Not Applicable    Goals of care, counseling/discussion [Z71.89]  Not Applicable    Counseling regarding advanced care planning and goals of care [Z71.89]  Not Applicable    Acute cystitis without hematuria [N30.00]  Yes    Elevated troponin level [R74.8]  Yes    GERD (gastroesophageal reflux disease) [K21.9]  Yes    Upper respiratory infection [J06.9]  Yes    Coronary artery disease involving native coronary artery [I25.10]  Yes    ESRD (end stage renal disease) on dialysis [N18.6, Z99.2]  Not Applicable    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       Allergies:  Review of patient's allergies indicates:   Allergen Reactions    Penicillins        Diet: cardiac diet    Activities: Activity as tolerated   CONSULTS:    Physical Therapy to evaluate and treat. , Occupational Therapy to evaluate and treat. and  to evaluate for community resources/long-range planning.    MISCELLANEOUS CARE:  Routine Skin for Bedridden Patients: Apply moisture barrier cream to all skin folds and wet areas in perineal area daily and after baths and all bowel movements. and Diabetes  Care:   SN to perform and educate Diabetic management with blood glucose monitoring:, Fingerstick blood sugar AC and HS and Report CBG < 60 or > 350 to physician.    WOUND CARE ORDERS  None    Medications: Review discharge medications with patient and family and provide education.      Current Discharge Medication List      CONTINUE these medications which have NOT CHANGED    Details   acetaminophen (TYLENOL) 325 MG tablet Take 2 tablets (650 mg total) by mouth every 8 (eight) hours as needed for Pain or Temperature greater than (100.4 F).  Refills: 0      aspirin 81 MG Chew Take 1 tablet (81 mg total) by mouth once daily.  Refills: 0      atorvastatin (LIPITOR) 40 MG tablet Take 1 tablet (40 mg total) by mouth once daily.  Qty: 30 tablet, Refills: 11      BD INSULIN SYRINGE ULTRA-FINE 0.5 mL 31 gauge x 5/16 Syrg USE TO INJECT INSULIN 2-3 TIMES A DAY  Refills: 3      calcium acetate (PHOSLO) 667 mg capsule TAKE 2 CAPSULES BY MOUTH 3 TIMES A DAY WITH MEALS  Qty: 180 capsule, Refills: 5      carvedilol (COREG) 6.25 MG tablet Take 1 tablet (6.25 mg total) by mouth 2 (two) times daily.  Qty: 60 tablet, Refills: 11      clopidogrel (PLAVIX) 75 mg tablet Take 1 tablet (75 mg total) by mouth once daily.  Qty: 30 tablet, Refills: 11      dextromethorphan-guaifenesin  mg Tab Take 1 tablet by mouth every 8 (eight) hours as needed.      DICYCLOMINE HCL (GI COCKTAIL SIMPLE RECORD) GI Cocktail (Mylanta 30 mL, 2% viscous lidocaine 10 mL, dicyclomine 10 mL)  Refills: 0      hydrALAZINE (APRESOLINE) 100 MG tablet Take 1 tablet (100 mg total) by mouth 3 (three) times daily.  Qty: 270 tablet, Refills: 6      insulin aspart (NOVOLOG) 100 unit/mL InPn pen Inject 0-5 Units into the skin before meals and at bedtime as needed (Hyperglycemia).  Refills: 0      irbesartan (AVAPRO) 300 MG tablet Take 300 mg by mouth once daily.  Refills: 3      loperamide (IMODIUM A-D) 2 mg Tab Take 2 mg by mouth 4 (four) times daily as needed.       meclizine (ANTIVERT) 25 mg tablet Take 1 tablet (25 mg total) by mouth 3 (three) times daily as needed for Dizziness.  Qty: 30 tablet, Refills: 0      nitroGLYCERIN (NITROSTAT) 0.4 MG SL tablet Place 1 tablet (0.4 mg total) under the tongue every 5 (five) minutes as needed for Chest pain (maximum of 3 tablets in 15 minutes.).  Qty: 30 tablet, Refills: 0      omeprazole (PRILOSEC) 40 MG capsule Take 40 mg by mouth once daily.  Refills: 0      polyvinyl alcohol, artificial tears, (LIQUIFILM TEARS) 1.4 % ophthalmic solution Place 1 drop into both eyes as needed.      propylene glycol (SYSTANE BALANCE) 0.6 % Drop Place 1 drop into both eyes 2 (two) times daily.      ranitidine (ZANTAC) 300 MG tablet Take 1 tablet (300 mg total) by mouth every evening.  Qty: 30 tablet, Refills: 11      SENSIPAR 30 mg Tab TAKE 1 TABLET EVERY DAY  Qty: 30 tablet, Refills: 6      vitamin renal formula, B-complex-vitamin c-folic acid, (NEPHROCAP) 1 mg Cap Take 1 capsule by mouth once daily.  Qty: 30 capsule, Refills: 3      white petrolatum-mineral oil 57.3-42.5% (REFRESH P.M.) 57.3-42.5 % Oint every evening.                  _________________________________  Beny Faria MD  04/25/2018

## 2018-04-25 NOTE — PLAN OF CARE
[facility] here to  patient. They did not bring their wheelchair van. [place] ([facility]) with transport informed TN that they have a van they can buckle the patient in. Someone would just need to bring the patient downstairs in a wheelchair. She stated if patient needs a wheelchair van, she can go back to [place] and get it for the patient. TN explained this to Maria Elena GALICIA. I also asked her if she thought patient needed a wheelchair van, or could travel in [facility]. She stated patient ok to travel in [facility] and have our PCT wheel her down.     Cindy Mast RN  Transition Navigator  [phone]

## 2018-04-25 NOTE — PT/OT/SLP PROGRESS
Physical Therapy      Patient Name:  Albina Garcia   MRN:  [id number]    Patient HANH in dialysis, will follow up as able    Donaldo Xiong, PT

## 2018-04-25 NOTE — PLAN OF CARE
TN spoke with Dr. Faria this morning. She stated after patient has dialysis, will return to the Nursing Home. TN informed her spoke with daughter yesterday regarding hospice at the nursing home, and she was still undecided. I also informed her the nursing home can set up hospice as well.     TN called daughter this morning but no response. Message left for call back.    TN spoke with Dr. Faria. I informed her spoke with Adrienne ([facility] Nurse), and she did not speak with daughter today either. TN informed her spoke with daughter yesterday, and they did not make a decision regarding hospice. TN informed her if family does want dialysis with hospice, then that may limit her choices as well. TN informed MD can place referral to hospice and the nursing home can set it up if that is what the family wants.    1320: TN spoke with Rosalba at [facility]. She was updated that patient will be going today and orders sent. TN also informed her regarding hospice referral. TN informed her if they could follow-up with the family, because they just wanted more information regarding hospice.     1332: TN called and spoke with patient's  Edwin. He was informed his wife is going back to the nursing home today. I tried getting in touch with patient's daughter, unable to contact her. TN informed him will have NH follow-up with long term plans and if/when they would want hospice.    1335: TN spoke with Emily at [facility]. She will send their transport for 3:00 pm    Dr. Torres stated patient does not need a cardiology follow-up.    Future Appointments  Date Time Provider Department Center   5/8/2018 10:00 AM Everette Theodore MD [facility] CARDIO [facility]   6/14/2018 8:40 AM WENDY Nair [facility] GASTRO [facility]     Follow-up With  Details  Why  Contact Info   [facility]  Go to  Primary Care Physician, Nursing Home  [address] [facility] [address]  [phone]   Everette Theodore MD   On 5/8/2018  at 10:00 am--Cardiology Follow-Up  [address]  [address]  [place] [address]  [phone]      04/25/18 1010   Final Note   Assessment Type Final Discharge Note   Discharge Disposition care home [facility]   What phone number can be called within the next 1-3 days to see how you are doing after discharge? [phone]   Hospital Follow Up  Appt(s) scheduled? Yes   Discharge plans and expectations educations in teach back method with documentation complete? Yes   Right Care Referral Info   Post Acute Recommendation Other   Referral Type Nursing Home   Facility Name Return to [facility]     Cindy Mast RN  Transition Navigator  [phone]

## 2018-04-25 NOTE — PLAN OF CARE
Problem: Patient Care Overview  Goal: Plan of Care Review  Outcome: Ongoing (interventions implemented as appropriate)  Plan of care reviewed with patient. Patient verbalized complete understanding. Fall precautions maintained. Bed in lowest position, locked, call light within reach and bed alarm is on. Side rails up x's 2 with slip resistant socks on. Accuchecks performed, PRN insulin given. Nurse instructed patient to notify staff for any assistance and the patient verbalized complete understanding. Patient on telemetry throughout shift with no ectopy noted. Will continue to monitor.

## 2018-04-25 NOTE — PT/OT/SLP DISCHARGE
Occupational Therapy Discharge Summary    Albina Garcia  MRN: [id number]   Principal Problem: NSTEMI (non-ST elevated myocardial infarction)      Patient Discharged from acute Occupational Therapy on 4/25.  Please refer to prior OT note dated 4/24 for functional status.    Assessment:      Patient appropriate for care in another setting.    Objective:     GOALS:    Occupational Therapy Goals     Not on file          Multidisciplinary Problems (Resolved)        Problem: Occupational Therapy Goal    Goal Priority Disciplines Outcome Interventions   Occupational Therapy Goal   (Resolved)     OT, PT/OT Outcome(s) achieved    Description:  Goals to be met by: 05/24/18     Patient will increase functional independence with ADLs by performing:    UE Dressing with Stand-by Assistance.  LE Dressing with Minimal Assistance.  Grooming while standing with Stand-by Assistance.  Toileting from toilet with Contact Guard Assistance for hygiene and clothing management.   Toilet transfer to toilet with Contact Guard Assistance.  Increased functional strength to WFL for ADLs.                      Reasons for Discontinuation of Therapy Services  Transfer to alternate level of care.      Plan:     Patient Discharged to: return to NH    Refugio Prieto OT  4/25/2018

## 2018-04-25 NOTE — PROGRESS NOTES
Progress Note  Nephrology      Consult Requested By: William Johnson III, MD      SUBJECTIVE:     Overnight events  Patient is a 86 y.o. female     Patient Active Problem List   Diagnosis    Metabolic bone disease    Hypertensive heart failure with end stage renal disease on dialysis    Anemia in ESRD (end-stage renal disease)    Chronic diastolic congestive heart failure    Type II diabetes mellitus with end-stage renal disease    Diabetic autonomic neuropathy associated with type 2 diabetes mellitus    Stenosis of arteriovenous dialysis fistula    ESRD (end stage renal disease) on dialysis    HTN (hypertension)    Coronary artery disease involving native coronary artery    Upper respiratory infection    Chest pain    NSTEMI (non-ST elevated myocardial infarction)    GERD (gastroesophageal reflux disease)    Abnormal serum lipase level    Hypophosphatemia    Elevated troponin    Elevated troponin level    Acute cystitis without hematuria    Palliative care encounter    Goals of care, counseling/discussion    Counseling regarding advanced care planning and goals of care     Past Medical History:   Diagnosis Date    Anemia in CKD (chronic kidney disease) 10/14/2013    Arthritis     Back pain, chronic     Blood transfusion     CHF (congestive heart failure)     Diabetes mellitus     Elevated cholesterol     ESRD on hemodialysis     esrd    HTN (hypertension) 10/14/2013    Metabolic bone disease 10/14/2013              OBJECTIVE:     Vitals:    04/25/18 0924 04/25/18 0954 04/25/18 1024 04/25/18 1054   BP: 138/73 131/71 (!) 124/59 (!) 123/58   BP Location:       Patient Position:       Pulse: 80 80 80 87   Resp: 16 16 16 16   Temp:       TempSrc:       SpO2:       Weight:       Height:           Temp: 97.2 °F (36.2 °C) (04/25/18 0900)  Pulse: 87 (04/25/18 1054)  Resp: 16 (04/25/18 1054)  BP: (!) 123/58 (04/25/18 1054)  SpO2: 98 % (04/25/18 0732)      Date 04/25/18 0700 - 04/26/18 0659    Shift [id number] 3106-7491 4411-0659 24 Hour Total   I  N  T  A  K  E   P.O. 125   125    Shift Total  (mL/kg) 125  (2.3)   125  (2.3)   O  U  T  P  U  T   Shift Total  (mL/kg)       Weight (kg) 53.5 53.5 53.5 53.5             Medications:   sodium chloride 0.9%   Intravenous Once    sodium chloride 0.9%   Intravenous Once    albuterol-ipratropium 2.5mg-0.5mg/3mL  3 mL Nebulization Q4H    artificial tears  2 drop Both Eyes BID    aspirin  81 mg Oral Daily    atorvastatin  40 mg Oral Nightly    calcium acetate  667 mg Oral TID WM    carvedilol  6.25 mg Oral BID    cefTRIAXone (ROCEPHIN) IVPB  1 g Intravenous Q24H    cinacalcet  30 mg Oral Daily    clopidogrel  75 mg Oral Daily    furosemide  40 mg Oral Daily    heparin (porcine)  5,000 Units Subcutaneous Q8H    hydrALAZINE  100 mg Oral TID    irbesartan  300 mg Oral Daily    lidocaine  2 patch Transdermal Q24H    pantoprazole  40 mg Oral Daily    vitamin renal formula (B-complex-vitamin c-folic acid)  1 capsule Oral Daily                 Physical Exam:  General appearance: NAD  No SOB  Cough  Lungs: diminished breath sounds  Heart: pulse 87  Abdomen: soft  Extremities: no edema  Skin: dry  Laboratory:  ABG  Labs reviewed  No results found for this or any previous visit (from the past 336 hour(s)).  Recent Results (from the past 336 hour(s))   CBC with Automated Differential    Collection Time: 04/25/18  3:58 AM   Result Value Ref Range    WBC 4.90 3.90 - 12.70 K/uL    Hemoglobin 9.6 (L) 12.0 - 16.0 g/dL    Hematocrit 30.9 (L) 37.0 - 48.5 %    Platelets 212 150 - 350 K/uL   CBC with Automated Differential    Collection Time: 04/24/18  3:09 AM   Result Value Ref Range    WBC 5.52 3.90 - 12.70 K/uL    Hemoglobin 10.1 (L) 12.0 - 16.0 g/dL    Hematocrit 32.2 (L) 37.0 - 48.5 %    Platelets 229 150 - 350 K/uL   CBC with Automated Differential    Collection Time: 04/23/18  8:10 AM   Result Value Ref Range    WBC 5.70 3.90 - 12.70 K/uL    Hemoglobin 9.4 (L)  12.0 - 16.0 g/dL    Hematocrit 29.5 (L) 37.0 - 48.5 %    Platelets 230 150 - 350 K/uL     Urinalysis  No results for input(s): COLORU, CLARITYU, SPECGRAV, PHUR, PROTEINUA, GLUCOSEU, BILIRUBINCON, BLOODU, WBCU, RBCU, BACTERIA, MUCUS, NITRITE, LEUKOCYTESUR, UROBILINOGEN, HYALINECASTS in the last 24 hours.    Diagnostic Results:  X-Ray: Reviewed  US: Reviewed  Echo: Reviewed  ACCESS    ASSESSMENT/PLAN:   ESRD  Hyponatremia  Metabolic acidosis  MBD  Anemia Hb 9.6  Poor nutrition  Albumin 3.3  /58  Weight daily  Renal diet  HD in progress

## 2018-04-25 NOTE — PROGRESS NOTES
Progress Note  [facility]  Admit Date: 4/21/2018   LOS: 3 days   SUBJECTIVE:       Patient was awake, alert and oriented x 2. She says she feels much better today and denies any cp, sob, nausea, vomiting, fever, chills and cough. She was also seen by [facility] cards for second opinion, they also want medical management.      ROS  Denies N/V/F/C/CP/SOB.   OBJECTIVE:   Vital Signs (Most Recent)  Temp: 96.6 °F (35.9 °C) (04/25/18 0753)  Pulse: 80 (04/25/18 0753)  Resp: 16 (04/25/18 0753)  BP: (!) 145/67 (04/25/18 0753)  SpO2: 98 % (04/25/18 0732)    I & O (Last 24H):    Intake/Output Summary (Last 24 hours) at 04/25/18 0853  Last data filed at 04/25/18 0845   Gross per 24 hour   Intake              305 ml   Output                0 ml   Net              305 ml     Wt Readings from Last 3 Encounters:   04/23/18 53.5 kg (117 lb 15.1 oz)   04/21/18 61.2 kg (135 lb)   02/04/18 58.8 kg (129 lb 10.1 oz)       Current Diet Order   Procedures    Diet Cardiac [facility]; Renal     Order Specific Question:   Indicate patient location for additional diet options:     Answer:   [facility]     Order Specific Question:   Additional Diet Options:     Answer:   Renal        Physical Exam  Constitutional: She is oriented to person, place, and time. No distress.   Appears stated age, elderly, frail   HENT:   Head: Normocephalic and atraumatic.   Mouth/Throat: No oropharyngeal exudate.   Neck: Normal range of motion. Neck supple. No JVD present.   Cardiovascular: Normal rate, regular rhythm, normal heart sounds and intact distal pulses.    No murmur heard.  Left upper arm fistula w/ good thrill   Pulmonary/Chest: Effort normal. No respiratory distress. She has no wheezes. She mild crackles in lower bases.   Abdominal: Soft. Bowel sounds are normal. She exhibits no distension and no mass. There is no tenderness.   Musculoskeletal: Normal range of motion. She exhibits no edema or deformity.   Neurological: She is alert and  oriented to person, place, and time. No cranial nerve deficit.   Skin: Capillary refill takes less than 2 seconds. She is not diaphoretic.   Psychiatric: She has a normal mood and affect. Her behavior is normal.   Nursing note and vitals reviewed.     Laboratory Data:  CBC    Recent Labs  Lab 04/23/18  0810 04/24/18  0309 04/25/18  0358   WBC 5.70 5.52 4.90   RBC 3.23* 3.48* 3.37*   HGB 9.4* 10.1* 9.6*   HCT 29.5* 32.2* 30.9*    229 212   MCV 91 93 92   MCH 29.1 29.0 28.5   MCHC 31.9* 31.4* 31.1*     CMP    Recent Labs  Lab 04/23/18  0810 04/24/18  0309 04/25/18  0358   CALCIUM 9.6 9.5 8.6*   PROT 7.9 8.0 7.4   * 133* 130*   K 4.6 4.6 4.0   CO2 24 24 21*   CL 89* 95 91*   BUN 68* 31* 51*   CREATININE 7.2* 4.0* 5.9*   ALKPHOS 270* 272* 248*   ALT 18 18 13   AST 29 31 22   BILITOT 0.7 0.7 0.4     POCT-Glucose  POCT Glucose   Date Value Ref Range Status   04/25/2018 260 (H) 70 - 110 mg/dL Final   04/24/2018 250 (H) 70 - 110 mg/dL Final   04/24/2018 200 (H) 70 - 110 mg/dL Final   04/24/2018 307 (H) 70 - 110 mg/dL Final   04/24/2018 216 (H) 70 - 110 mg/dL Final   04/23/2018 297 (H) 70 - 110 mg/dL Final   04/23/2018 162 (H) 70 - 110 mg/dL Final   04/23/2018 174 (H) 70 - 110 mg/dL Final   04/23/2018 227 (H) 70 - 110 mg/dL Final   04/22/2018 315 (H) 70 - 110 mg/dL Final   04/22/2018 344 (H) 70 - 110 mg/dL Final     COAGS  No results for input(s): PT, INR, APTT in the last 168 hours.  UA  No results for input(s): COLORU, CLARITYU, SPECGRAV, PHUR, PROTEINUA, GLUCOSEU, BLOODU, WBCU, RBCU, BACTERIA, MUCUS in the last 24 hours.    Invalid input(s):  BILIRUBINCON  MICRO  Microbiology Results (last 7 days)     ** No results found for the last 168 hours. **        LIPID PANEL  Lab Results   Component Value Date    CHOL 102 (L) 04/22/2018     Lab Results   Component Value Date    HDL 45 04/22/2018     Lab Results   Component Value Date    LDLCALC 44.0 (L) 04/22/2018     Lab Results   Component Value Date    TRIG 65  04/22/2018     Lab Results   Component Value Date    CHOLHDL 44.1 04/22/2018     ASSESSMENT/PLAN:   Albina Garcia is a 86 y.o. female     Hypertensive heart failure with end stage renal disease on dialysis M/W/F  BNP > 70,000 but with no leg swelling and some wheezing  Last ECHO was 9/17: EF of 55%, DD and mild MR and TR    Avoiding IVF fluids for now as CXR demonstrates likely overload  Consult Nephrology- Fremin  BP stable, albeit slightly high On irbesartan 200 mg daily, hydarlazine 100 mg po TID, Coreg 6.25 BID, Lipitor  Volume overload better with dialysis  CXR improving pulmonary edema  Dialysis today    NSTEMI:  Trop in ED 0.547 > 1.199 >1.94>4.9>2.85  No chest pain   EKG with no ST changes, but + for flipped T waves in inferior leads  Cardiology consulted, will appreciate further recs, medical management recommended  Already on ASA, plavix, statin  Echo on 4/23: EF 35 %, DD   [facility] cards was also consulted per family request for 2n opinion, and they are also recommending medical management and higher risks for invasive procedure.  She is doing well and PT recs to return to the nursing home with PT/OT services.      Acute cystitis without hematuria  Urine 2+ leuks, >100 WBCs, with many bacteria, nitrite neg  Urine culture pending, with susceptibilities  Avoiding nitrofurantin and bactrim due to geriatric population and renal disease  Giving 5 days of Rocephin for additional respiratory coverage    GERD:   Continue PPI. Stable     URI:   Sputum color change from white to yellow, non-smoker  In nursing home, so potentially would need to cover for HAP  Xray demonstrates bilat infiltrates vs edema  Given dose of levo x 1 in ED.  On rocephin x 5 days for UTI + possible PNA (was taking Z-armani)    ESRD:   MWF Dialysis  Completed full dialysis Friday  Dialysis on Sunday and Monday and today  Will appreciate nephro recs  Daily labs      PPx: Heparin    Case discussed with staff    4/25/2018 Beny Faria MD  8:46  AM

## 2018-04-25 NOTE — DISCHARGE SUMMARY
[facility]  Discharge Summary      Admit Date: 4/21/2018    Discharge Date and Time:  04/25/2018 1:27 PM    Attending Physician: No att. providers found     Reason for Admission: NSTEMI    Procedures Performed: * No surgery found *    HPI: 85 yo F w/ ESRD on HD, CAD (s/p PCI 5/17 and stent placement in 9/17 w/ Dr. Castano), HTN, GERD, DM (HgbA1C 6.3- on diet control), chronic diastolic HF presents from [facility] through [place] [facility] for elevated troponin.  Original EMS call placed from [facility] home was for SOB, but pt now reports only fatigue and overall malaise.  She and her daughter state this has been present to 2 weeks, but she has had decreased PO intake for 9 months since going to the nursing home.     Dialysis is MWF.  She was dialyzed yesterday for a full treatment with no complications.  Denies chest pain although trop is elevated beyond her baseline mild elevation.  EKG shows no ST changes.     She also reports a productive cough.  Sputum had been white and frothy, but today was yellow.  Roommate at nursing home also with cough.  Had been taking Z-armani.  Denies fevers, chills, abdom pain, N/V.   Complains of dry right eye chronically.     Pt admitted for ACS rule-out.  Also, found to have UTI on UA. Culture pending.  Will treat empirically and follow cultures, though has PCN allergy and renal disease. Currently no chest pain.       ED talked with cardiology and pt will evaluated further by them in the AM.    Hospital Course: Pt arrived to hospital complaining of SOB, malaise, fatigue; admitted 4/21 with elevated troponin, elevated BNP, EKG with ST depressions in II, III, aVF. WBCs and bacteria present on UA. Cardiology was consulted and the pt was started on rocephin for UTI. Trended troponins continued to rise to a peak of 4.059 on 4/23 then began to decrease. 2D echo on 4/23 demonstrated moderately depressed LV systolic function (EF 35-40%), impaired LV relaxation, normal RV systolic  function. Cardiology recommendations suggested medical management rather than any procedural intervention. Pt's heart medications include ASA 81mg, plavix 75mg, lipitor 40mg, coreg 6.25mg BID, hydralazine 100mg TID, irbesartan 300mg. Pt also has ESRD and was dialyzed during her stay twice: 4/23 and 4/25. Pt discharged back to nursing home in stable condition on 4/25 with PT/OT.  She also had a UTI that was treated with Rocephin as inpatient.     Consults: cardiology    Significant Diagnostic Studies:   Troponin 1.199 on 4/25, peaked at 4.059 on 4/23  HbA1c 7.0 on 4/21  BNP 4775 on 4/23  UA >100 WBC and many bacteria 4/21    Final Diagnoses:    Principal Problem: NSTEMI (non-ST elevated myocardial infarction)   Secondary Diagnoses:   Active Hospital Problems    Diagnosis  POA    *NSTEMI (non-ST elevated myocardial infarction) [I21.4]  Yes    Stented coronary artery [Z95.5]  Not Applicable    Palliative care encounter [Z51.5]  Not Applicable    Goals of care, counseling/discussion [Z71.89]  Not Applicable    Counseling regarding advanced care planning and goals of care [Z71.89]  Not Applicable    Acute cystitis without hematuria [N30.00]  Yes    Elevated troponin level [R74.8]  Yes    GERD (gastroesophageal reflux disease) [K21.9]  Yes    Upper respiratory infection [J06.9]  Yes    Coronary artery disease involving native coronary artery [I25.10]  Yes    ESRD (end stage renal disease) on dialysis [N18.6, Z99.2]  Not Applicable    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       Discharged Condition: fair    Disposition: Nursing Facility    Follow Up/Patient Instructions:      Medications:  Reconciled Home Medications:      Medication List      CHANGE how you take these medications    atorvastatin 40 MG tablet  Commonly known as:  LIPITOR  Take 1 tablet (40 mg total) by mouth once daily.  What changed:  when to take this        CONTINUE taking these medications    acetaminophen 325 MG tablet  Commonly known as:  TYLENOL  Take 2 tablets (650 mg total) by mouth every 8 (eight) hours as needed for Pain or Temperature greater than (100.4 F).     aspirin 81 MG Chew  Take 1 tablet (81 mg total) by mouth once daily.     BD INSULIN SYRINGE ULTRA-FINE 0.5 mL 31 gauge x 5/16 Syrg  Generic drug:  insulin syringe-needle U-100  USE TO INJECT INSULIN 2-3 TIMES A DAY     calcium acetate 667 mg capsule  Commonly known as:  PHOSLO  TAKE 2 CAPSULES BY MOUTH 3 TIMES A DAY WITH MEALS     carvedilol 6.25 MG tablet  Commonly known as:  COREG  Take 1 tablet (6.25 mg total) by mouth 2 (two) times daily.     clopidogrel 75 mg tablet  Commonly known as:  PLAVIX  Take 1 tablet (75 mg total) by mouth once daily.     dextromethorphan-guaifenesin  mg Tab  Take 1 tablet by mouth every 8 (eight) hours as needed.     GI COCKTAIL SIMPLE RECORD  GI Cocktail (Mylanta 30 mL, 2% viscous lidocaine 10 mL, dicyclomine 10 mL)     hydrALAZINE 100 MG tablet  Commonly known as:  APRESOLINE  Take 1 tablet (100 mg total) by mouth 3 (three) times daily.     insulin aspart U-100 100 unit/mL Inpn pen  Commonly known as:  NovoLOG  Inject 0-5 Units into the skin before meals and at bedtime as needed (Hyperglycemia).     irbesartan 300 MG tablet  Commonly known as:  AVAPRO  Take 300 mg by mouth once daily.     loperamide 2 mg Tab  Commonly known as:  IMODIUM A-D  Take 2 mg by mouth 4 (four) times daily as needed.     meclizine 25 mg tablet  Commonly known as:  ANTIVERT  Take 1 tablet (25 mg total) by mouth 3 (three) times daily as needed for Dizziness.     nitroGLYCERIN 0.4 MG SL tablet  Commonly known as:  NITROSTAT  Place 1  tablet (0.4 mg total) under the tongue every 5 (five) minutes as needed for Chest pain (maximum of 3 tablets in 15 minutes.).     omeprazole 40 MG capsule  Commonly known as:  PRILOSEC  Take 40 mg by mouth once daily.     polyvinyl alcohol (artificial tears) 1.4 % ophthalmic solution  Commonly known as:  LIQUIFILM TEARS  Place 1 drop into both eyes as needed.     ranitidine 300 MG tablet  Commonly known as:  ZANTAC  Take 1 tablet (300 mg total) by mouth every evening.     REFRESH P.M. 57.3-42.5 % Oint  Generic drug:  white petrolatum-mineral oil 57.3-42.5%  every evening.     SENSIPAR 30 MG Tab  Generic drug:  cinacalcet  TAKE 1 TABLET EVERY DAY     SYSTANE BALANCE 0.6 % Drop  Generic drug:  propylene glycol  Place 1 drop into both eyes 2 (two) times daily.     vitamin renal formula (B-complex-vitamin c-folic acid) 1 mg Cap  Commonly known as:  NEPHROCAP  Take 1 capsule by mouth once daily.            Discharge Procedure Orders  Ambulatory Referral to Hospice   Referral Priority: Routine Referral Type: Hospice   Referral Reason: Specialty Services Required    Requested Specialty: Hospice and Palliative Medicine    Number of Visits Requested: 1      Diet Cardiac     Activity as tolerated     Notify your health care provider if you experience any of the following:  temperature >100.4     Notify your health care provider if you experience any of the following:  persistent nausea and vomiting or diarrhea     Notify your health care provider if you experience any of the following:  severe uncontrolled pain     Notify your health care provider if you experience any of the following:  severe persistent headache     Notify your health care provider if you experience any of the following:  difficulty breathing or increased cough     Notify your health care provider if you experience any of the following:  increased confusion or weakness       Follow-up Information     Go to [facility].    Specialties:  Nursing Home  Agency, SNF Agency  Why:  Primary Care Physician, Nursing Home  Contact information:  [address] [facility] [address]  [phone]             Everette Theodore MD On 5/8/2018.    Specialty:  Cardiology  Why:  at 10:00 am--Cardiology Follow-Up  Contact information:  [address]  [address]  [place] [facility] [address]  [phone]                 Time spent 45 mins    4/25/2018 3:51 PM Beny Faria M.D.

## 2018-04-25 NOTE — PLAN OF CARE
Patient arrived to  from dialysis via bed with transport staff. Patient alert and oriented X4 Follows commands with prompting.  Verbalizes answers without difficulty High fall risk protocol established. Bed alarm activated. Vitals stable. For further information refer to admission assessment data sheets. Will cont to monitor patient and intervene prn.

## 2018-04-25 NOTE — PLAN OF CARE
Future Appointments  Date Time Provider Department Center   6/14/2018 8:40 AM WENDY N[facility]     Follow-up With  Details  Why  Contact Info   [facility]  Go to  Primary Care Physician, Nursing Home  [address] [facility] [address]  [phone]   Everette Theodore MD  On 5/8/2018  at 10:00 am--Cardiology Follow-Up  [address]  [address]  [place] [address]  [phone]     Cindy Mast RN  Transition Navigator  [phone]

## 2018-04-25 NOTE — PLAN OF CARE
Patient d/c [facility] d/c instructions given to the patient , verbalized understanding. Education given, refer to clinical reference for education. IV removed tip intact. Telemetry d/c. Waiting for [facility] transportation to arrive to pick the patient up. Report called to Isabel at [facility].

## 2018-04-25 NOTE — PROGRESS NOTES
Cardiology Progress Note    Admit Date: 4/21/2018   LOS: 3 days     Subjective     Pt did well overnight. No cp or sob. Underwent Hd today without any issues. Per notes it appears pt and family may pursue hospice care. Plan for pt to return to nursing facility today.     Objective     Vital Signs (Most Recent)  Temp: 97.5 °F (36.4 °C) (04/25/18 1245)  Pulse: 89 (04/25/18 1245)  Resp: 16 (04/25/18 1245)  BP: 129/62 (04/25/18 1245)  SpO2: 98 % (04/25/18 0732)    I & O (Last 24H):  Intake/Output Summary (Last 24 hours) at 04/25/18 1344  Last data filed at 04/25/18 1245   Gross per 24 hour   Intake              525 ml   Output             2100 ml   Net            -1575 ml       Physical Exam:  GEN: Awake, alert, following all commands  HEENT: MMM, No jvd, eomi grossly intact  Heart: RRR, no mrg  Lungs: CTA BL, no wrr  Abdomen: Soft, ND, NT, BS+  Ext: moving all ext, +pulses upper ext, no edema appreciated    Laboratory:    Recent Labs  Lab 04/23/18  0810 04/24/18  0309 04/25/18  0358   WBC 5.70 5.52 4.90   RBC 3.23* 3.48* 3.37*   HGB 9.4* 10.1* 9.6*   HCT 29.5* 32.2* 30.9*    229 212   MCV 91 93 92   MCH 29.1 29.0 28.5   MCHC 31.9* 31.4* 31.1*       Recent Labs  Lab 04/23/18  0810 04/24/18  0309 04/25/18  0358   * 133* 130*   K 4.6 4.6 4.0   CL 89* 95 91*   CO2 24 24 21*   BUN 68* 31* 51*   CREATININE 7.2* 4.0* 5.9*   * 184* 280*       Recent Labs  Lab 04/23/18  0810 04/24/18  0309 04/25/18  0358   CALCIUM 9.6 9.5 8.6*   MG 2.3 2.3 2.0   PHOS 3.1 2.7 3.0       Recent Labs  Lab 04/23/18  0810 04/24/18  0309 04/25/18  0358   PROT 7.9 8.0 7.4   ALBUMIN 3.5 3.4* 3.3*   BILITOT 0.7 0.7 0.4   AST 29 31 22   ALKPHOS 270* 272* 248*   ALT 18 18 13     No results for input(s): PT, INR, APTT in the last 168 hours.    Recent Labs  Lab 04/22/18  0505 04/23/18  0810 04/24/18  1041   TROPONINI 1.942* 4.059* 2.851*       Recent Labs  Lab 04/23/18  0810   BNP 4,775*       Scheduled Meds:   sodium chloride 0.9%    Intravenous Once    albuterol-ipratropium 2.5mg-0.5mg/3mL  3 mL Nebulization Q4H    artificial tears  2 drop Both Eyes BID    aspirin  81 mg Oral Daily    atorvastatin  40 mg Oral Nightly    carvedilol  6.25 mg Oral BID    cefTRIAXone (ROCEPHIN) IVPB  1 g Intravenous Q24H    cinacalcet  30 mg Oral Daily    clopidogrel  75 mg Oral Daily    epoetin juan (PROCRIT) injection  3,000 Units Intravenous Every Mon, Wed, Fri    [START ON 4/26/2018] furosemide  80 mg Oral Daily    heparin (porcine)  5,000 Units Subcutaneous Q8H    hydrALAZINE  100 mg Oral TID    irbesartan  300 mg Oral Daily    lidocaine  2 patch Transdermal Q24H    pantoprazole  40 mg Oral Daily    vitamin renal formula (B-complex-vitamin c-folic acid)  1 capsule Oral Daily     Continuous Infusions:  PRN Meds:sodium chloride 0.9%, sodium chloride 0.9%, acetaminophen, artificial tears, dextromethorphan-guaifenesin  mg/5 ml, dextrose 50%, dextrose 50%, diphenhydrAMINE, glucagon (human recombinant), glucose, glucose, insulin aspart U-100, nitroGLYCERIN, ondansetron, senna-docusate 8.6-50 mg, sodium chloride 0.9%      Assessment/Plan     # Type 1 NSTEMI w/ hx of CAD s/p recent 2017 PCI to mRCA and D1.   # MvD / CAD s/p PCI in 2017 x2 MATTHEW   # Acute systolic heart failure 2/2 fluid overload (EF 40%)  # ESRD on HD    - Killip class 3 on admission now 1  - Elevated KERRY score given multiple co-morbidities and NSTEMI   - ALBERTO score of 6 (40.9% all cause mortality risk) on admission  - ST depressions resolved.  - Plan for conservative mgmt for ACS. Pt is now euvolemic s/p HD and is asymptomatic without any recurrent cp since admission. Trop peaked as well. Not a great invasive strategy candidate and would overall favor conservative medical mgmt with medications at this time.   - Cont ASA, plavix, irbesaratan, coreg, lipitor 40mg daily at this time   - Can cont to fu with cards outpt  - No further recs at this time. Please call with any further  questions or concerns. Will sign off.     Benitez Gutierrez  [facility] Cardiology Fellow   Cell: [phone]

## 2018-04-26 NOTE — PT/OT/SLP DISCHARGE
Physical Therapy Discharge Summary    Name: Albina Garcia  MRN: [id number]   Principal Problem: NSTEMI (non-ST elevated myocardial infarction)     Patient Discharged from acute Physical Therapy on 2018.  Please refer to prior PT noted date on 2018 for functional status.     Assessment:     Patient appropriate for care in another setting.    Objective:     GOALS:    Physical Therapy Goals     Not on file          Multidisciplinary Problems (Resolved)        Problem: Physical Therapy Goal    Goal Priority Disciplines Outcome Goal Variances Interventions   Physical Therapy Goal   (Resolved)     PT/OT, PT Outcome(s) achieved     Description:  Goals to be met by: 2018     Patient will increase functional independence with mobility by performin. Supine to sit with Stand-by Assistance  2. Sit to stand transfer with Stand-by Assistance  3. Bed to chair transfer with Stand-by Assistance using Rolling Walker  4. Gait  x 50 feet with Stand-by Assistance using Rolling Walker.                       Reasons for Discontinuation of Therapy Services  Transfer to alternate level of care.      Plan:     [facility] stefano Xiong, PT  2018

## 2018-04-26 NOTE — PHYSICIAN QUERY
PT Name: Albina Garcia  MR #: [id number]     Physician Query Form - Documentation Clarification      CDS/: Billie Romano               Contact information:[email or web address]    This form is a permanent document in the medical record.     Query Date: April 26, 2018    By submitting this query, we are merely seeking further clarification of documentation. Please utilize your independent clinical judgment when addressing the question(s) below.    The Medical record reflects the following:    Supporting Clinical Findings Location in Medical Record   Hypertensive heart failure with end stage renal disease on dialysis    BNP > 70,000 but with no leg swelling and some wheezing   Last ECHO was 9/17:   EF of 55%      Hx of CHF  MWF Dialysis     Volume overload, got dialyzed yesterday     CONCLUSIONS :     1 - Moderately depressed left ventricular systolic function (EF 35-40%).   2 - Impaired LV relaxation, increased LVEDP.   3 - Normal right ventricular systolic function .   4 - The estimated PA systolic pressure is 7 mmHg    Diastolic indices: E wave velocity 0.9 m/s, E/A ratio 0.7,  msec., E/e' ratio(lat) 15. Mean left atrial pressures are increased. LVEDP is estimated to be 19 mmHg. There is diastolic dysfunction secondary to relaxation abnormality.     H&P                    HM note 4-23    2D Echo 4-23         Chronic diastolic congestive heart failure    -presented with elevated NT pro BNP and BNP   -CXR with pulmonary vascular congestion and pleural effusion L>R      Acute systolic heart failure 2/2 fluid overload (EF 40%)      Cardiology note 4-23            Cardiology note 4-25                                                                            Doctor, Please specify diagnosis or diagnoses associated with above clinical findings.    Please further specify the type and acuity of CHF diagnosis.    Provider Use Only      [     ]  Acute on chronic diastolic CHF      [    x ]  Acute  on chronic systolic CHF      [     ]  Acute on chronic systolic and diastolic CHF      [     ]  Acute systolic CHF      [     ]  Other CHF:please specify type and acuity      ________________________________                                                                                                               [  ] Clinically undetermined

## 2018-05-03 ENCOUNTER — PATIENT MESSAGE (OUTPATIENT)
Dept: CARDIOLOGY | Facility: CLINIC | Age: 83
End: 2018-05-03

## 2018-05-10 ENCOUNTER — OFFICE VISIT (OUTPATIENT)
Dept: CARDIOLOGY | Facility: CLINIC | Age: 83
End: 2018-05-10
Payer: MEDICARE

## 2018-05-10 VITALS
SYSTOLIC BLOOD PRESSURE: 147 MMHG | HEIGHT: 63 IN | HEART RATE: 73 BPM | DIASTOLIC BLOOD PRESSURE: 64 MMHG | OXYGEN SATURATION: 100 %

## 2018-05-10 DIAGNOSIS — I50.32 CHRONIC DIASTOLIC CONGESTIVE HEART FAILURE: Primary | Chronic | ICD-10-CM

## 2018-05-10 DIAGNOSIS — N18.6 HYPERTENSIVE HEART FAILURE WITH END STAGE RENAL DISEASE ON DIALYSIS: Chronic | ICD-10-CM

## 2018-05-10 DIAGNOSIS — D63.1 ANEMIA IN ESRD (END-STAGE RENAL DISEASE): ICD-10-CM

## 2018-05-10 DIAGNOSIS — I13.2 HYPERTENSIVE HEART FAILURE WITH END STAGE RENAL DISEASE ON DIALYSIS: Chronic | ICD-10-CM

## 2018-05-10 DIAGNOSIS — I25.10 CORONARY ARTERY DISEASE INVOLVING NATIVE CORONARY ARTERY OF NATIVE HEART WITHOUT ANGINA PECTORIS: ICD-10-CM

## 2018-05-10 DIAGNOSIS — I10 ESSENTIAL HYPERTENSION: ICD-10-CM

## 2018-05-10 DIAGNOSIS — Z99.2 ESRD (END STAGE RENAL DISEASE) ON DIALYSIS: ICD-10-CM

## 2018-05-10 DIAGNOSIS — N18.6 ESRD (END STAGE RENAL DISEASE) ON DIALYSIS: ICD-10-CM

## 2018-05-10 DIAGNOSIS — Z99.2 HYPERTENSIVE HEART FAILURE WITH END STAGE RENAL DISEASE ON DIALYSIS: Chronic | ICD-10-CM

## 2018-05-10 DIAGNOSIS — N18.6 ANEMIA IN ESRD (END-STAGE RENAL DISEASE): ICD-10-CM

## 2018-05-10 DIAGNOSIS — Z95.5 STENTED CORONARY ARTERY: ICD-10-CM

## 2018-05-10 PROCEDURE — 99213 OFFICE O/P EST LOW 20 MIN: CPT | Mod: PBBFAC,PO | Performed by: INTERNAL MEDICINE

## 2018-05-10 PROCEDURE — 99999 PR PBB SHADOW E&M-EST. PATIENT-LVL III: CPT | Mod: PBBFAC,,, | Performed by: INTERNAL MEDICINE

## 2018-05-10 PROCEDURE — 99214 OFFICE O/P EST MOD 30 MIN: CPT | Mod: S$PBB,,, | Performed by: INTERNAL MEDICINE

## 2018-05-10 RX ORDER — DOXYCYCLINE HYCLATE 100 MG
100 TABLET ORAL EVERY 12 HOURS
Qty: 14 TABLET | Refills: 0 | Status: ON HOLD | OUTPATIENT
Start: 2018-05-10 | End: 2018-06-19

## 2018-05-10 NOTE — PROGRESS NOTES
Subjective:   Patient ID:  Albina Garcia is a 86 y.o. female who presents for follow-up of Hospital Follow Up      Problem List Items Addressed This Visit        Cardiac/Vascular    Hypertensive heart failure with end stage renal disease on dialysis (Chronic)    Chronic diastolic congestive heart failure - Primary (Chronic)    HTN (hypertension)    Coronary artery disease involving native coronary artery    Stented coronary artery       Renal/    ESRD (end stage renal disease) on dialysis       Oncology    Anemia in ESRD (end-stage renal disease)          HPI: 87 y/o AA female with CAD s/p PCI (D2 with 2.25 x 14 mm Resolute MATTHEW, POBA of RCA with 2.0 mm 9/2017 + D1 PCI 2.25 x 14 mm Resolute MATTHEW 5/2017), HTN, HLP, ESRD on HD, diastolic dysfunction, DM II, and nursing home resident present as f/u after hospitalization for acute decompensated systolic/diastolic HF and NSTEMI. No intervention done and patient is being treated medically. HD was done to remove fluid. She is almost completely HD dependent. BP is controlled. She is having a cough that has been present for three months productive of clear and sometime colored sputum. No fever/chills. She quit smoking 34 years ago. Occasional orthopnea but no PND.     Review of Systems   Constitution: Negative.   HENT: Negative.    Eyes: Negative.    Respiratory: Positive for cough and sputum production.    Endocrine: Negative.    Hematologic/Lymphatic: Negative.    Skin: Negative.    Musculoskeletal: Negative.    Gastrointestinal: Negative.    Neurological: Negative.    Psychiatric/Behavioral: Negative.    Allergic/Immunologic: Negative.        Patient's Medications   New Prescriptions    No medications on file   Previous Medications    ACETAMINOPHEN (TYLENOL) 325 MG TABLET    Take 2 tablets (650 mg total) by mouth every 8 (eight) hours as needed for Pain or Temperature greater than (100.4 F).    ASPIRIN 81 MG CHEW    Take 1 tablet (81 mg total) by mouth once daily.     ATORVASTATIN (LIPITOR) 40 MG TABLET    Take 1 tablet (40 mg total) by mouth once daily.    BD INSULIN SYRINGE ULTRA-FINE 0.5 ML 31 GAUGE X 5/16 SYRG    USE TO INJECT INSULIN 2-3 TIMES A DAY    CALCIUM ACETATE (PHOSLO) 667 MG CAPSULE    TAKE 2 CAPSULES BY MOUTH 3 TIMES A DAY WITH MEALS    CARVEDILOL (COREG) 6.25 MG TABLET    Take 1 tablet (6.25 mg total) by mouth 2 (two) times daily.    CLOPIDOGREL (PLAVIX) 75 MG TABLET    Take 1 tablet (75 mg total) by mouth once daily.    DEXTROMETHORPHAN-GUAIFENESIN  MG TAB    Take 1 tablet by mouth every 8 (eight) hours as needed.    DICYCLOMINE HCL (GI COCKTAIL SIMPLE RECORD)    GI Cocktail (Mylanta 30 mL, 2% viscous lidocaine 10 mL, dicyclomine 10 mL)    HYDRALAZINE (APRESOLINE) 100 MG TABLET    Take 1 tablet (100 mg total) by mouth 3 (three) times daily.    INSULIN ASPART (NOVOLOG) 100 UNIT/ML INPN PEN    Inject 0-5 Units into the skin before meals and at bedtime as needed (Hyperglycemia).    IRBESARTAN (AVAPRO) 300 MG TABLET    Take 300 mg by mouth once daily.    LOPERAMIDE (IMODIUM A-D) 2 MG TAB    Take 2 mg by mouth 4 (four) times daily as needed.    MECLIZINE (ANTIVERT) 25 MG TABLET    Take 1 tablet (25 mg total) by mouth 3 (three) times daily as needed for Dizziness.    NITROGLYCERIN (NITROSTAT) 0.4 MG SL TABLET    Place 1 tablet (0.4 mg total) under the tongue every 5 (five) minutes as needed for Chest pain (maximum of 3 tablets in 15 minutes.).    OMEPRAZOLE (PRILOSEC) 40 MG CAPSULE    Take 40 mg by mouth once daily.    POLYVINYL ALCOHOL, ARTIFICIAL TEARS, (LIQUIFILM TEARS) 1.4 % OPHTHALMIC SOLUTION    Place 1 drop into both eyes as needed.    PROPYLENE GLYCOL (SYSTANE BALANCE) 0.6 % DROP    Place 1 drop into both eyes 2 (two) times daily.    RANITIDINE (ZANTAC) 300 MG TABLET    Take 1 tablet (300 mg total) by mouth every evening.    SENSIPAR 30 MG TAB    TAKE 1 TABLET EVERY DAY    VITAMIN RENAL FORMULA, B-COMPLEX-VITAMIN C-FOLIC ACID, (NEPHROCAP) 1 MG CAP    Take  1 capsule by mouth once daily.    WHITE PETROLATUM-MINERAL OIL 57.3-42.5% (REFRESH P.M.) 57.3-42.5 % OINT    every evening.   Modified Medications    No medications on file   Discontinued Medications    No medications on file       Objective:   Physical Exam   Constitutional: She is oriented to person, place, and time. She appears well-developed and well-nourished. No distress.   Examination of the digits showed no clubbing or cyanosis   HENT:   Head: Normocephalic and atraumatic.   Eyes: Conjunctivae are normal. Pupils are equal, round, and reactive to light. Right eye exhibits no discharge.   Neck: Normal range of motion. Neck supple. No JVD present. No thyromegaly present.   No carotid bruits   Cardiovascular: Normal rate, regular rhythm, S1 normal, S2 normal, normal heart sounds, intact distal pulses and normal pulses.  PMI is not displaced.  Exam reveals no gallop, no friction rub and no opening snap.    No murmur heard.  Pulmonary/Chest: Effort normal. No respiratory distress. She has no wheezes. She has no rales. She exhibits no tenderness.   Decrease breath sounds in base bilaterally   Abdominal: Soft. Bowel sounds are normal. She exhibits no distension and no mass. There is no tenderness. There is no guarding.   No hepatosplenomegaly   Musculoskeletal: Normal range of motion. She exhibits no edema or tenderness.   Lymphadenopathy:     She has no cervical adenopathy.   Neurological: She is alert and oriented to person, place, and time.   Skin: Skin is warm. No rash noted. She is not diaphoretic. No erythema.   Psychiatric: She has a normal mood and affect.   Nursing note and vitals reviewed.      ECGs reviewed-NSR  LABS reviewed  Imaging including Echoes reviewed-ef 35% with DD    Assessment:     1. Chronic diastolic congestive heart failure    2. Hypertensive heart failure with end stage renal disease on dialysis    3. Essential hypertension    4. Coronary artery disease involving native coronary artery of  native heart without angina pectoris    5. Stented coronary artery    6. ESRD (end stage renal disease) on dialysis    7. Anemia in ESRD (end-stage renal disease)        Plan:     Continue current medications  HD for fluid removal  Doxycycline 100 mg bid for 7 days for bronchitis.   Activity as tolerate  F/u in 3 months    Clinic time spent with patient discussing and treating medical condition including reviewing images, ECG, labs and medications > 20 minutes.

## 2018-06-14 ENCOUNTER — OFFICE VISIT (OUTPATIENT)
Dept: GASTROENTEROLOGY | Facility: CLINIC | Age: 83
End: 2018-06-14
Payer: MEDICARE

## 2018-06-14 VITALS — DIASTOLIC BLOOD PRESSURE: 55 MMHG | HEART RATE: 80 BPM | SYSTOLIC BLOOD PRESSURE: 143 MMHG

## 2018-06-14 DIAGNOSIS — K21.9 GASTROESOPHAGEAL REFLUX DISEASE, ESOPHAGITIS PRESENCE NOT SPECIFIED: Primary | ICD-10-CM

## 2018-06-14 PROCEDURE — 99999 PR PBB SHADOW E&M-EST. PATIENT-LVL II: CPT | Mod: PBBFAC,,, | Performed by: NURSE PRACTITIONER

## 2018-06-14 PROCEDURE — 99212 OFFICE O/P EST SF 10 MIN: CPT | Mod: PBBFAC,PO | Performed by: NURSE PRACTITIONER

## 2018-06-14 PROCEDURE — 99213 OFFICE O/P EST LOW 20 MIN: CPT | Mod: S$PBB,,, | Performed by: NURSE PRACTITIONER

## 2018-06-14 NOTE — PROGRESS NOTES
Subjective:       Patient ID: Albina Garcia is a 86 y.o. female.    Chief Complaint: Follow-up    HPI  Presents to follow up after medication changes three months ago.  At that time she had been hospitalized with complaints of chest pain.  Cardiac workup was negative, though she has stents placed less than one year ago and is followed by Dr. Castano.    She was describing burning and sour liquid into the mouth.  She was started on omeprazole daily and had improvement but not resolution.   She noted complaints at night and admitted that she would eat dinner and then typically go to bed.  She lives at a nursing home.    We discussed avoiding eating and then lying down and discussed dietary modification.  Ranitidine 300 mg was added at bedtime.  We discussed EGD which she did not want to proceed with.    She reports today that she is not eating close to bedtime, has avoided fried foods and is using omeprazole and ranitidine and has had not further complaints.  She denies abdominal pain, chest pain, heartburn, reflux, nausea, vomiting, dysphagia.  Denies blood with bowel movements or black stools.  Reports regular bowel habit.      Review of Systems   Constitutional: Negative for activity change, appetite change, fatigue, fever and unexpected weight change.   Respiratory: Negative for chest tightness and shortness of breath.    Cardiovascular: Negative for chest pain.   Gastrointestinal: Negative for abdominal distention, abdominal pain, blood in stool, constipation, diarrhea, nausea and vomiting.   Skin: Negative.    Psychiatric/Behavioral: Negative.        Objective:      Physical Exam   Constitutional: She is oriented to person, place, and time. She appears well-developed and well-nourished. No distress.   Accompanied by nursing home staff   Eyes: No scleral icterus.   Pulmonary/Chest: Effort normal. No respiratory distress.   Abdominal: Soft. There is no tenderness.   Musculoskeletal:   In wheelchair   Neurological:  She is alert and oriented to person, place, and time.   Skin: Skin is warm and dry. She is not diaphoretic.   Psychiatric: She has a normal mood and affect. Her behavior is normal. Judgment and thought content normal.   Vitals reviewed.      Assessment:       1. Gastroesophageal reflux disease, esophagitis presence not specified        Plan:     Albina was seen today for follow-up.    Diagnoses and all orders for this visit:    Gastroesophageal reflux disease, esophagitis presence not specified      Continue diet and lifestyle modification.    Continue current medications.  Follow up as needed.

## 2018-06-19 PROBLEM — R94.31 EKG ABNORMALITIES: Status: ACTIVE | Noted: 2018-06-19

## 2018-07-13 ENCOUNTER — PATIENT MESSAGE (OUTPATIENT)
Dept: NEPHROLOGY | Facility: CLINIC | Age: 83
End: 2018-07-13